# Patient Record
Sex: FEMALE | Race: WHITE | NOT HISPANIC OR LATINO | Employment: FULL TIME | URBAN - METROPOLITAN AREA
[De-identification: names, ages, dates, MRNs, and addresses within clinical notes are randomized per-mention and may not be internally consistent; named-entity substitution may affect disease eponyms.]

---

## 2021-01-07 ENCOUNTER — TELEMEDICINE (OUTPATIENT)
Dept: FAMILY MEDICINE CLINIC | Facility: CLINIC | Age: 51
End: 2021-01-07
Payer: COMMERCIAL

## 2021-01-07 VITALS — BODY MASS INDEX: 39.37 KG/M2 | WEIGHT: 245 LBS | HEIGHT: 66 IN

## 2021-01-07 DIAGNOSIS — Z03.818 ENCOUNTER FOR OBSERVATION FOR SUSPECTED EXPOSURE TO OTHER BIOLOGICAL AGENTS RULED OUT: ICD-10-CM

## 2021-01-07 DIAGNOSIS — B34.9 VIRAL INFECTION, UNSPECIFIED: ICD-10-CM

## 2021-01-07 PROCEDURE — U0005 INFEC AGEN DETEC AMPLI PROBE: HCPCS | Performed by: FAMILY MEDICINE

## 2021-01-07 PROCEDURE — U0003 INFECTIOUS AGENT DETECTION BY NUCLEIC ACID (DNA OR RNA); SEVERE ACUTE RESPIRATORY SYNDROME CORONAVIRUS 2 (SARS-COV-2) (CORONAVIRUS DISEASE [COVID-19]), AMPLIFIED PROBE TECHNIQUE, MAKING USE OF HIGH THROUGHPUT TECHNOLOGIES AS DESCRIBED BY CMS-2020-01-R: HCPCS | Performed by: FAMILY MEDICINE

## 2021-01-07 PROCEDURE — 99213 OFFICE O/P EST LOW 20 MIN: CPT | Performed by: FAMILY MEDICINE

## 2021-01-07 RX ORDER — METFORMIN HYDROCHLORIDE 500 MG/1
TABLET, EXTENDED RELEASE ORAL
COMMUNITY
Start: 2020-12-19 | End: 2022-03-24 | Stop reason: SDUPTHER

## 2021-01-07 RX ORDER — LEVOTHYROXINE SODIUM 50 MCG
TABLET ORAL
COMMUNITY
Start: 2020-12-19 | End: 2021-03-19

## 2021-01-07 NOTE — PROGRESS NOTES
COVID-19 Virtual Visit     Assessment/Plan:    Problem List Items Addressed This Visit        Other    Encounter for observation for suspected exposure to other biological agents ruled out    Relevant Orders    Novel Coronavirus (COVID-19), PCR [facility] - Collected at [facility] or [facility]    Viral infection, unspecified    Relevant Orders    Novel Coronavirus (COVID-19), PCR [facility] - Collected at [place] [id number] or [facility]         Disposition:     I referred patient to one of our centralized sites for a COVID-19 swab  I have spent 15 minutes directly with the patient  Greater than 50% of this time was spent in counseling/coordination of care regarding: prognosis, instructions for management, importance of treatment compliance, risk factor reductions and impressions  [address]    Your healthcare provider and/or public health staff have evaluated you and have determined that you do not need to remain in the hospital at this time  At this time you can be isolated at home where you will be monitored by staff from your local or state health department  You should carefully follow the prevention and isolation steps below until a healthcare provider or local or state health department says that you can return to your normal activities  Stay home except to get medical care    People who are mildly ill with COVID-19 are able to isolate at home during their illness  You should restrict activities outside your home, except for getting medical care  Do not go to work, school, or public areas  Avoid using public transportation, ride-sharing, or taxis  Separate yourself from other people and animals in your home    People: As much as possible, you should stay in a specific room and away from other people in your home  Also, you should use a separate bathroom, if available  Animals:  You should restrict contact with pets and other animals while you are sick with COVID-19, just like you would around other people  Although there have not been reports of pets or other animals becoming sick with COVID-19, it is still recommended that people sick with COVID-19 limit contact with animals until more information is known about the virus  When possible, have another member of your household care for your animals while you are sick  If you are sick with COVID-19, avoid contact with your pet, including petting, snuggling, being kissed or licked, and sharing food  If you must care for your pet or be around animals while you are sick, wash your hands before and after you interact with pets and wear a facemask  See COVID-19 and Animals for more information  Call ahead before visiting your doctor    If you have a medical appointment, call the healthcare provider and tell them that you have or may have COVID-19  This will help the healthcare providers office take steps to keep other people from getting infected or exposed  Wear a facemask    You should wear a facemask when you are around other people (e g , sharing a room or vehicle) or pets and before you enter a healthcare providers office  If you are not able to wear a facemask (for example, because it causes trouble breathing), then people who live with you should not stay in the same room with you, or they should wear a facemask if they enter your room  Cover your coughs and sneezes    Cover your mouth and nose with a tissue when you cough or sneeze  Throw used tissues in a lined trash can  Immediately wash your hands with soap and water for at least 20 seconds or, if soap and water are not available, clean your hands with an alcohol-based hand  that contains at least 60% alcohol  Clean your hands often    Wash your hands often with soap and water for at least 20 seconds, especially after blowing your nose, coughing, or sneezing; going to the bathroom; and before eating or preparing food   If soap and water are not readily available, use an alcohol-based hand  with at least 60% alcohol, covering all surfaces of your hands and rubbing them together until they feel dry  Soap and water are the best option if hands are visibly dirty  Avoid touching your eyes, nose, and mouth with unwashed hands  Avoid sharing personal household items    You should not share dishes, drinking glasses, cups, eating utensils, towels, or bedding with other people or pets in your home  After using these items, they should be washed thoroughly with soap and water  Clean all high-touch surfaces everyday    High touch surfaces include counters, tabletops, doorknobs, bathroom fixtures, toilets, phones, keyboards, tablets, and bedside tables  Also, clean any surfaces that may have blood, stool, or body fluids on them  Use a household cleaning spray or wipe, according to the label instructions  Labels contain instructions for safe and effective use of the cleaning product including precautions you should take when applying the product, such as wearing gloves and making sure you have good ventilation during use of the product  Monitor your symptoms    Seek prompt medical attention if your illness is worsening (e g , difficulty breathing)  Before seeking care, call your healthcare provider and tell them that you have, or are being evaluated for, COVID-19  Put on a facemask before you enter the facility  These steps will help the healthcare providers office to keep other people in the office or waiting room from getting infected or exposed  Ask your healthcare provider to call the local or state health department  Persons who are placed under active monitoring or facilitated self-monitoring should follow instructions provided by their local health department or occupational health professionals, as appropriate  If you have a medical emergency and need to call 911, notify the dispatch personnel that you have, or are being evaluated for COVID-19   If possible, put on a facemask before emergency medical services arrive  Discontinuing home isolation    Patients with confirmed COVID-19 should remain under home isolation precautions until the following conditions are met:   - They have had no fever for at least 24 hours (that is one full day of no fever without the use medicine that reduces fevers)  AND  - other symptoms have improved (for example, when their cough or shortness of breath have improved)  AND  - If had mild or moderate illness, at least 10 days have passed since their symptoms first appeared or if severe illness (needed oxygen) or immunosuppressed, at least 20 days have passed since symptoms first appeared  Patients with confirmed COVID-19 should also notify close contacts (including their workplace) and ask that they self-quarantine  Currently, close contact is defined as being within 6 feet for 15 minutes or more from the period 24 hours starting 48 hours before symptom onset to the time at which the patient went into isolation  Close contacts of patients diagnosed with COVID-19 should be instructed by the patient to self-quarantine for 14 days from the last time of their last contact with the patient  Source: [facility]      Encounter provider Cal Collins MD    Provider located at [address] [place], [place] [id number]    Recent Visits  No visits were found meeting these conditions  Showing recent visits within past 7 days and meeting all other requirements     Today's Visits  Date Type Provider Dept   01/07/21 Telemedicine Cal Collins MD [facility]   Showing today's visits and meeting all other requirements     Future Appointments  No visits were found meeting these conditions     Showing future appointments within next 150 days and meeting all other requirements      This virtual check-in was done via [employer] and patient was informed that this is a secure, HIPAA-compliant platform  She agrees to proceed  Patient agrees to participate in a virtual check in via telephone or video visit instead of presenting to the office to address urgent/immediate medical needs  Patient is aware this is a billable service  After connecting through [facility], the patient was identified by name and date of birth  Bernadette Riley was informed that this was a telemedicine visit and that the exam was being conducted confidentially over secure lines  My office door was closed  No one else was in the room  Bernadette Riley acknowledged consent and understanding of privacy and security of the telemedicine visit  I informed the patient that I have reviewed her record in Epic and presented the opportunity for her to ask any questions regarding the visit today  The patient agreed to participate  Subjective:   Bernadette Riley is a 48 y o  female who is concerned about COVID-19  Patient's symptoms include chills, fatigue, nasal congestion, cough, nausea, myalgias and headache  Patient denies malaise, rhinorrhea, sore throat, anosmia, loss of taste, shortness of breath, chest tightness, abdominal pain, vomiting and diarrhea       Date of symptom onset: 1/5/2021  Date of exposure: 1/3/2021    Exposure:   Contact with a person who is under investigation (PUI) for or who is positive for COVID-19 within the last 14 days?: Yes    Hospitalized recently for fever and/or lower respiratory symptoms?: No      Currently a healthcare worker that is involved in direct patient care?: No      Works in a special setting where the risk of COVID-19 transmission may be high? (this may include long-term care, correctional and retirement facilities; homeless shelters; assisted-living facilities and group homes ): No      Resident in a special setting where the risk of COVID-19 transmission may be high? (this may include long-term care, correctional and retirement facilities; homeless shelters; assisted-living facilities and group homes ): No      She says she was exposed to brother-in-law who is being tested for COVID and he has symptoms   They were at same place on Sunday  She says her  has also have some cold symptoms but he is not tested yet  She was slightly dizzy but now she is feeling better she is diabetic and on metformin    No results found for: Ria Smith, [address]  History reviewed  No pertinent past medical history  History reviewed  No pertinent surgical history  Current Outpatient Medications   Medication Sig Dispense Refill    metFORMIN (GLUCOPHAGE-XR) 500 mg 24 hr tablet       Synthroid 50 MCG tablet        No current facility-administered medications for this visit  No Known Allergies    Review of Systems   Constitutional: Positive for chills and fatigue  HENT: Positive for congestion  Negative for rhinorrhea and sore throat  Eyes: Negative  Respiratory: Positive for cough  Negative for chest tightness and shortness of breath  Gastrointestinal: Positive for nausea  Negative for abdominal pain, diarrhea and vomiting  Musculoskeletal: Positive for myalgias  Neurological: Positive for dizziness and headaches  Objective:    Vitals:    01/07/21 0940   Weight: 111 kg (245 lb)   Height: 5' 6" (1 676 m)       Physical Exam  HENT:      Head: Normocephalic  Eyes:      Extraocular Movements: Extraocular movements intact  Neck:      Musculoskeletal: Neck supple  Pulmonary:      Effort: Pulmonary effort is normal  No respiratory distress  Neurological:      Mental Status: She is alert  Psychiatric:         Mood and Affect: Mood normal        VIRTUAL VISIT DISCLAIMER    Itzel Herman acknowledges that she has consented to an online visit or consultation   She understands that the online visit is based solely on information provided by her, and that, in the absence of a face-to-face physical evaluation by the physician, the diagnosis she receives is both limited and provisional in terms of accuracy and completeness  This is not intended to replace a full medical face-to-face evaluation by the physician  Armando Jimenez understands and accepts these terms

## 2021-01-08 LAB — SARS-COV-2 RNA SPEC QL NAA+PROBE: DETECTED

## 2021-01-11 ENCOUNTER — TELEMEDICINE (OUTPATIENT)
Dept: FAMILY MEDICINE CLINIC | Facility: CLINIC | Age: 51
End: 2021-01-11
Payer: COMMERCIAL

## 2021-01-11 ENCOUNTER — TELEPHONE (OUTPATIENT)
Dept: OBGYN CLINIC | Facility: CLINIC | Age: 51
End: 2021-01-11

## 2021-01-11 DIAGNOSIS — R73.03 PREDIABETES: ICD-10-CM

## 2021-01-11 DIAGNOSIS — U07.1 COVID-19 VIRUS INFECTION: Primary | ICD-10-CM

## 2021-01-11 DIAGNOSIS — E03.9 ACQUIRED HYPOTHYROIDISM: ICD-10-CM

## 2021-01-11 PROCEDURE — 99213 OFFICE O/P EST LOW 20 MIN: CPT | Performed by: FAMILY MEDICINE

## 2021-01-11 RX ORDER — ACETAMINOPHEN 325 MG/1
650 TABLET ORAL ONCE AS NEEDED
Status: CANCELLED | OUTPATIENT
Start: 2021-01-12

## 2021-01-11 RX ORDER — ALBUTEROL SULFATE 90 UG/1
3 AEROSOL, METERED RESPIRATORY (INHALATION) ONCE AS NEEDED
Status: CANCELLED | OUTPATIENT
Start: 2021-01-12

## 2021-01-11 RX ORDER — SODIUM CHLORIDE 9 MG/ML
20 INJECTION, SOLUTION INTRAVENOUS ONCE
Status: CANCELLED | OUTPATIENT
Start: 2021-01-12

## 2021-01-11 NOTE — PROGRESS NOTES
COVID-19 Virtual Visit     Assessment/Plan:    Problem List Items Addressed This Visit        Endocrine    Acquired hypothyroidism       Other    COVID-19 virus infection - Primary    Prediabetes    BMI 39 0-39 9,adult         Disposition:     I recommended continued isolation until at least 24 hours have passed since recovery defined as resolution of fever without the use of fever-reducing medications AND improvement in COVID symptoms AND 10 days have passed since onset of symptoms (or 10 days have passed since date of first positive viral diagnostic test for asymptomatic patients)  Patient is a candidate for Bamlanivimab  They were counseled in regards to risks, benefits, and side effects of this infusion  Possible side effects of bamlanivimab are: Allergic reactions   Allergic reactions can happen during and after infusion with bamlanivimab which include:    Fever, chills, nausea, headache, shortness of breath, low blood pressure, wheezing, swelling of your lips, face, or throat, rash including hives, itching, muscle aches, and dizziness  The side effects of getting any medicine by vein may include brief pain, bleeding, bruising of the skin, soreness, swelling, and possible infection at the infusion site  These are not all the possible side effects of bamlanivimab  Not a lot of people have been given bamlanivimab  Serious and unexpected side effects may happen  Levar Lamasbody is still being studied so it is possible that all of the risks are not known at this time  Please note that this drug was approved under the Emergency Use Authorization of the FDA and has not gone through the full, formal FDA approval process    It is possible that bamlanivimab could interfere with your body's own ability to fight off a future infection of SARS-CoV-2  Similarly, bamlanivimab may reduce your bodys immune response to a vaccine for SARS-CoV-2   Specific studies have not been conducted to address these possible risks      Currently there is no data or safety or efficacy of COVID-19 vaccination in persons who received monoclonal antibodies as part of COVID-19 treatment  Treatment should be deferred for at least 90 days to avoid interference of the treatment with vaccine-induced immune responses (this is based on estimated half-life of therapies and evidence suggesting reinfection is uncommon within 90 days of initial infection)  The patient consents to proceed with bamlanivimab infusion  *[email or web address]  com/eua/[email or web address]-[email or web address]-factsheet-hcp  pdf    I have spent 20 minutes directly with the patient  Greater than 50% of this time was spent in counseling/coordination of care regarding: diagnostic results, prognosis, risks and benefits of treatment options, instructions for management, patient and family education, importance of treatment compliance, risk factor reductions and impressions  Encounter provider Kamila Ray MD    Provider located at [address] [place] [id number]    Recent Visits  Date Type Provider Dept   01/07/21 Telemedicine Kamila Ray MD [facility]   Showing recent visits within past 7 days and meeting all other requirements     Today's Visits  Date Type Provider Dept   01/11/21 Telemedicine Kamila Ray MD [facility]   Showing today's visits and meeting all other requirements     Future Appointments  No visits were found meeting these conditions  Showing future appointments within next 150 days and meeting all other requirements      This virtual check-in was done via [employer] and patient was informed that this is a secure, HIPAA-compliant platform  She agrees to proceed  Patient agrees to participate in a virtual check in via telephone or video visit instead of presenting to the office to address urgent/immediate medical needs  Patient is aware this is a billable service      After connecting through [facility], the patient was identified by name and date of birth  Imer Benson was informed that this was a telemedicine visit and that the exam was being conducted confidentially over secure lines  My office door was closed  No one else was in the room  Imer Benson acknowledged consent and understanding of privacy and security of the telemedicine visit  I informed the patient that I have reviewed her record in Epic and presented the opportunity for her to ask any questions regarding the visit today  The patient agreed to participate  Subjective:   Imer Benson is a 48 y o  female who has been screened for COVID-19  Symptom change since last report: unchanged  Patient's symptoms include fever, chills, fatigue, malaise, cough, shortness of breath, nausea, vomiting and diarrhea  Patient denies congestion, rhinorrhea, sore throat, anosmia, loss of taste, chest tightness, abdominal pain, myalgias and headaches  Joellen Cooper has been staying home and has isolated themselves in her home  She is taking care to not share personal items and is cleaning all surfaces that are touched often, like counters, tabletops, and doorknobs using household cleaning sprays or wipes  She is wearing a mask when she leaves her room  she felt sob last night after excessive cough , ,otherwise she is fine   Lab Results   Component Value Date    SARSCOV2 Detected (A) 01/07/2021     History reviewed  No pertinent past medical history  History reviewed  No pertinent surgical history  Current Outpatient Medications   Medication Sig Dispense Refill    metFORMIN (GLUCOPHAGE-XR) 500 mg 24 hr tablet       Synthroid 50 MCG tablet        No current facility-administered medications for this visit  No Known Allergies    Review of Systems   Constitutional: Positive for chills, fatigue and fever  HENT: Negative for congestion, rhinorrhea and sore throat  Respiratory: Positive for cough and shortness of breath  Negative for chest tightness      Gastrointestinal: Positive for diarrhea, nausea and vomiting  Negative for abdominal pain  Musculoskeletal: Negative for myalgias  Neurological: Negative for headaches  Hematological: Negative  Psychiatric/Behavioral: Negative  Objective:    Vitals:       Physical Exam  Constitutional:       Appearance: She is ill-appearing  HENT:      Mouth/Throat:      Mouth: Mucous membranes are dry  Eyes:      Extraocular Movements: Extraocular movements intact  Pulmonary:      Effort: Pulmonary effort is normal  No respiratory distress  Skin:     Coloration: Skin is not jaundiced or pale  Neurological:      General: No focal deficit present  Mental Status: She is alert  Psychiatric:         Mood and Affect: Mood normal        VIRTUAL VISIT DISCLAIMER    Imer Marcelino acknowledges that she has consented to an online visit or consultation  She understands that the online visit is based solely on information provided by her, and that, in the absence of a face-to-face physical evaluation by the physician, the diagnosis she receives is both limited and provisional in terms of accuracy and completeness  This is not intended to replace a full medical face-to-face evaluation by the physician  Imer Benson understands and accepts these terms

## 2021-01-12 ENCOUNTER — HOSPITAL ENCOUNTER (OUTPATIENT)
Dept: INFUSION CENTER | Facility: HOSPITAL | Age: 51
Discharge: HOME | End: 2021-01-12
Payer: COMMERCIAL

## 2021-01-12 VITALS
SYSTOLIC BLOOD PRESSURE: 116 MMHG | RESPIRATION RATE: 18 BRPM | OXYGEN SATURATION: 90 % | DIASTOLIC BLOOD PRESSURE: 61 MMHG | HEART RATE: 71 BPM | TEMPERATURE: 100.5 F

## 2021-01-12 DIAGNOSIS — E03.9 ACQUIRED HYPOTHYROIDISM: Primary | ICD-10-CM

## 2021-01-12 DIAGNOSIS — U07.1 COVID-19 VIRUS INFECTION: ICD-10-CM

## 2021-01-12 DIAGNOSIS — R73.03 PREDIABETES: ICD-10-CM

## 2021-01-12 PROCEDURE — M0239 BAMLANIVIMAB-XXXX INFUSION: HCPCS | Performed by: FAMILY MEDICINE

## 2021-01-12 RX ORDER — ALBUTEROL SULFATE 90 UG/1
3 AEROSOL, METERED RESPIRATORY (INHALATION) ONCE AS NEEDED
Status: DISCONTINUED | OUTPATIENT
Start: 2021-01-12 | End: 2021-01-15 | Stop reason: HOSPADM

## 2021-01-12 RX ORDER — ALBUTEROL SULFATE 90 UG/1
3 AEROSOL, METERED RESPIRATORY (INHALATION) ONCE AS NEEDED
Status: CANCELLED | OUTPATIENT
Start: 2021-01-12

## 2021-01-12 RX ORDER — ACETAMINOPHEN 325 MG/1
650 TABLET ORAL ONCE AS NEEDED
Status: DISCONTINUED | OUTPATIENT
Start: 2021-01-12 | End: 2021-01-15 | Stop reason: HOSPADM

## 2021-01-12 RX ORDER — SODIUM CHLORIDE 9 MG/ML
20 INJECTION, SOLUTION INTRAVENOUS ONCE
Status: CANCELLED | OUTPATIENT
Start: 2021-01-12

## 2021-01-12 RX ORDER — SODIUM CHLORIDE 9 MG/ML
20 INJECTION, SOLUTION INTRAVENOUS ONCE
Status: COMPLETED | OUTPATIENT
Start: 2021-01-12 | End: 2021-01-12

## 2021-01-12 RX ORDER — ACETAMINOPHEN 325 MG/1
650 TABLET ORAL ONCE AS NEEDED
Status: CANCELLED | OUTPATIENT
Start: 2021-01-12

## 2021-01-12 RX ADMIN — ACETAMINOPHEN 650 MG: 325 TABLET, FILM COATED ORAL at 12:26

## 2021-01-12 RX ADMIN — SODIUM CHLORIDE 20 ML/HR: 0.9 INJECTION, SOLUTION INTRAVENOUS at 10:58

## 2021-01-12 RX ADMIN — SODIUM CHLORIDE 700 MG: 9 INJECTION, SOLUTION INTRAVENOUS at 11:15

## 2021-01-12 NOTE — PROGRESS NOTES
Pt here for Bamlanivimab infusion  IV started with no issue and infusing with NSS @ [facility]  Vital signs stable  EUA given to pt and reviewed  Pt resting comfortably and has no further questions or concerns  Call bell with in reach

## 2021-01-12 NOTE — PROGRESS NOTES
Pt tolerated treatment well with no adverse reactions  Pt taken to observation room at this time for 1 hour

## 2021-01-14 ENCOUNTER — HOSPITAL ENCOUNTER (INPATIENT)
Facility: HOSPITAL | Age: 51
LOS: 6 days | Discharge: HOME | DRG: 177 | End: 2021-01-20
Attending: INTERNAL MEDICINE | Admitting: INTERNAL MEDICINE
Payer: COMMERCIAL

## 2021-01-14 ENCOUNTER — APPOINTMENT (EMERGENCY)
Dept: RADIOLOGY | Facility: HOSPITAL | Age: 51
DRG: 177 | End: 2021-01-14
Payer: COMMERCIAL

## 2021-01-14 DIAGNOSIS — R09.02 HYPOXIA: ICD-10-CM

## 2021-01-14 DIAGNOSIS — U07.1 COVID-19: Primary | ICD-10-CM

## 2021-01-14 DIAGNOSIS — U07.1 COVID-19 VIRUS INFECTION: ICD-10-CM

## 2021-01-14 PROBLEM — J96.91 RESPIRATORY FAILURE WITH HYPOXIA (HCC): Status: ACTIVE | Noted: 2021-01-14

## 2021-01-14 LAB
ALBUMIN SERPL BCP-MCNC: 3.9 G/DL (ref 3.4–4.8)
ALP SERPL-CCNC: 44.7 U/L (ref 35–140)
ALT SERPL W P-5'-P-CCNC: 23 U/L (ref 5–54)
ANION GAP SERPL CALCULATED.3IONS-SCNC: 9 MMOL/L (ref 4–13)
AST SERPL W P-5'-P-CCNC: 39 U/L (ref 15–41)
BASOPHILS # BLD AUTO: 0.01 THOUSANDS/ΜL (ref 0–0.1)
BASOPHILS NFR BLD AUTO: 0 % (ref 0–1)
BILIRUB SERPL-MCNC: 0.88 MG/DL (ref 0.3–1.2)
BUN SERPL-MCNC: 18 MG/DL (ref 6–20)
CALCIUM SERPL-MCNC: 8.8 MG/DL (ref 8.4–10.2)
CHLORIDE SERPL-SCNC: 100 MMOL/L (ref 96–108)
CO2 SERPL-SCNC: 29 MMOL/L (ref 22–33)
CREAT SERPL-MCNC: 0.71 MG/DL (ref 0.4–1.1)
CRP SERPL QL: 6.8 MG/L (ref 0–1)
D DIMER PPP FEU-MCNC: 0.5 MG/L FEU (ref 0.19–0.49)
EOSINOPHIL # BLD AUTO: 0 THOUSAND/ΜL (ref 0–0.61)
EOSINOPHIL NFR BLD AUTO: 0 % (ref 0–6)
ERYTHROCYTE [DISTWIDTH] IN BLOOD BY AUTOMATED COUNT: 13.3 % (ref 11.6–15.1)
FERRITIN SERPL-MCNC: 523 NG/ML (ref 8–388)
GFR SERPL CREATININE-BSD FRML MDRD: 100 ML/MIN/1.73SQ M
GLUCOSE SERPL-MCNC: 121 MG/DL (ref 65–140)
GLUCOSE SERPL-MCNC: 175 MG/DL (ref 65–140)
HCT VFR BLD AUTO: 39.9 % (ref 34.8–46.1)
HGB BLD-MCNC: 13.6 G/DL (ref 11.5–15.4)
IMM GRANULOCYTES # BLD AUTO: 0.01 THOUSAND/UL (ref 0–0.2)
IMM GRANULOCYTES NFR BLD AUTO: 0 % (ref 0–2)
LACTATE SERPL-SCNC: 1.4 MMOL/L (ref 0–2)
LYMPHOCYTES # BLD AUTO: 1.07 THOUSANDS/ΜL (ref 0.6–4.47)
LYMPHOCYTES NFR BLD AUTO: 24 % (ref 14–44)
MAGNESIUM SERPL-MCNC: 2.2 MG/DL (ref 1.6–2.6)
MCH RBC QN AUTO: 30 PG (ref 26.8–34.3)
MCHC RBC AUTO-ENTMCNC: 34.1 G/DL (ref 31.4–37.4)
MCV RBC AUTO: 88 FL (ref 82–98)
MONOCYTES # BLD AUTO: 0.45 THOUSAND/ΜL (ref 0.17–1.22)
MONOCYTES NFR BLD AUTO: 10 % (ref 4–12)
NEUTROPHILS # BLD AUTO: 2.88 THOUSANDS/ΜL (ref 1.85–7.62)
NEUTS SEG NFR BLD AUTO: 66 % (ref 43–75)
PLATELET # BLD AUTO: 203 THOUSANDS/UL (ref 149–390)
PMV BLD AUTO: 11.3 FL (ref 8.9–12.7)
POTASSIUM SERPL-SCNC: 3.3 MMOL/L (ref 3.5–5)
PROCALCITONIN SERPL-MCNC: 0.3 NG/ML
PROT SERPL-MCNC: 7.5 G/DL (ref 6.4–8.3)
RBC # BLD AUTO: 4.53 MILLION/UL (ref 3.81–5.12)
SODIUM SERPL-SCNC: 138 MMOL/L (ref 133–145)
TROPONIN I SERPL-MCNC: <0.03 NG/ML (ref 0–0.07)
WBC # BLD AUTO: 4.42 THOUSAND/UL (ref 4.31–10.16)

## 2021-01-14 PROCEDURE — 84145 PROCALCITONIN (PCT): CPT | Performed by: PHYSICIAN ASSISTANT

## 2021-01-14 PROCEDURE — 99285 EMERGENCY DEPT VISIT HI MDM: CPT

## 2021-01-14 PROCEDURE — 84484 ASSAY OF TROPONIN QUANT: CPT | Performed by: INTERNAL MEDICINE

## 2021-01-14 PROCEDURE — 99285 EMERGENCY DEPT VISIT HI MDM: CPT | Performed by: PHYSICIAN ASSISTANT

## 2021-01-14 PROCEDURE — 83605 ASSAY OF LACTIC ACID: CPT | Performed by: PHYSICIAN ASSISTANT

## 2021-01-14 PROCEDURE — 96361 HYDRATE IV INFUSION ADD-ON: CPT

## 2021-01-14 PROCEDURE — 93005 ELECTROCARDIOGRAM TRACING: CPT

## 2021-01-14 PROCEDURE — 36415 COLL VENOUS BLD VENIPUNCTURE: CPT | Performed by: PHYSICIAN ASSISTANT

## 2021-01-14 PROCEDURE — 85025 COMPLETE CBC W/AUTO DIFF WBC: CPT | Performed by: PHYSICIAN ASSISTANT

## 2021-01-14 PROCEDURE — 96374 THER/PROPH/DIAG INJ IV PUSH: CPT

## 2021-01-14 PROCEDURE — 96375 TX/PRO/DX INJ NEW DRUG ADDON: CPT

## 2021-01-14 PROCEDURE — 82948 REAGENT STRIP/BLOOD GLUCOSE: CPT

## 2021-01-14 PROCEDURE — 99223 1ST HOSP IP/OBS HIGH 75: CPT | Performed by: INTERNAL MEDICINE

## 2021-01-14 PROCEDURE — 85379 FIBRIN DEGRADATION QUANT: CPT | Performed by: PHYSICIAN ASSISTANT

## 2021-01-14 PROCEDURE — 71045 X-RAY EXAM CHEST 1 VIEW: CPT

## 2021-01-14 PROCEDURE — 83735 ASSAY OF MAGNESIUM: CPT | Performed by: PHYSICIAN ASSISTANT

## 2021-01-14 PROCEDURE — XW033E5 INTRODUCTION OF REMDESIVIR ANTI-INFECTIVE INTO PERIPHERAL VEIN, PERCUTANEOUS APPROACH, NEW TECHNOLOGY GROUP 5: ICD-10-PCS | Performed by: INTERNAL MEDICINE

## 2021-01-14 PROCEDURE — 80053 COMPREHEN METABOLIC PANEL: CPT | Performed by: PHYSICIAN ASSISTANT

## 2021-01-14 PROCEDURE — 86140 C-REACTIVE PROTEIN: CPT | Performed by: PHYSICIAN ASSISTANT

## 2021-01-14 PROCEDURE — 82728 ASSAY OF FERRITIN: CPT | Performed by: PHYSICIAN ASSISTANT

## 2021-01-14 RX ORDER — ATORVASTATIN CALCIUM 40 MG/1
40 TABLET, FILM COATED ORAL
Status: DISCONTINUED | OUTPATIENT
Start: 2021-01-14 | End: 2021-01-20 | Stop reason: HOSPADM

## 2021-01-14 RX ORDER — ASCORBIC ACID 500 MG
1000 TABLET ORAL EVERY 12 HOURS SCHEDULED
Status: DISCONTINUED | OUTPATIENT
Start: 2021-01-14 | End: 2021-01-20 | Stop reason: HOSPADM

## 2021-01-14 RX ORDER — FAMOTIDINE 20 MG/1
20 TABLET, FILM COATED ORAL ONCE
Status: COMPLETED | OUTPATIENT
Start: 2021-01-14 | End: 2021-01-14

## 2021-01-14 RX ORDER — BENZONATATE 100 MG/1
100 CAPSULE ORAL 3 TIMES DAILY PRN
Status: DISCONTINUED | OUTPATIENT
Start: 2021-01-14 | End: 2021-01-20 | Stop reason: HOSPADM

## 2021-01-14 RX ORDER — ZINC SULFATE 50(220)MG
220 CAPSULE ORAL DAILY
Status: COMPLETED | OUTPATIENT
Start: 2021-01-14 | End: 2021-01-20

## 2021-01-14 RX ORDER — LEVOTHYROXINE SODIUM 0.05 MG/1
50 TABLET ORAL
Status: DISCONTINUED | OUTPATIENT
Start: 2021-01-14 | End: 2021-01-20 | Stop reason: HOSPADM

## 2021-01-14 RX ORDER — DOXYCYCLINE HYCLATE 100 MG/1
100 CAPSULE ORAL EVERY 12 HOURS
Status: DISCONTINUED | OUTPATIENT
Start: 2021-01-14 | End: 2021-01-18

## 2021-01-14 RX ORDER — POTASSIUM CHLORIDE 20 MEQ/1
40 TABLET, EXTENDED RELEASE ORAL ONCE
Status: COMPLETED | OUTPATIENT
Start: 2021-01-14 | End: 2021-01-14

## 2021-01-14 RX ORDER — GUAIFENESIN 600 MG
600 TABLET, EXTENDED RELEASE 12 HR ORAL EVERY 12 HOURS SCHEDULED
Status: DISCONTINUED | OUTPATIENT
Start: 2021-01-14 | End: 2021-01-20 | Stop reason: HOSPADM

## 2021-01-14 RX ORDER — DEXAMETHASONE SODIUM PHOSPHATE 4 MG/ML
6 INJECTION, SOLUTION INTRA-ARTICULAR; INTRALESIONAL; INTRAMUSCULAR; INTRAVENOUS; SOFT TISSUE ONCE
Status: COMPLETED | OUTPATIENT
Start: 2021-01-14 | End: 2021-01-14

## 2021-01-14 RX ORDER — FAMOTIDINE 20 MG/1
20 TABLET, FILM COATED ORAL 2 TIMES DAILY
Status: DISCONTINUED | OUTPATIENT
Start: 2021-01-14 | End: 2021-01-14

## 2021-01-14 RX ORDER — DEXAMETHASONE SODIUM PHOSPHATE 4 MG/ML
6 INJECTION, SOLUTION INTRA-ARTICULAR; INTRALESIONAL; INTRAMUSCULAR; INTRAVENOUS; SOFT TISSUE EVERY 24 HOURS
Status: DISCONTINUED | OUTPATIENT
Start: 2021-01-15 | End: 2021-01-20 | Stop reason: HOSPADM

## 2021-01-14 RX ORDER — ONDANSETRON 2 MG/ML
4 INJECTION INTRAMUSCULAR; INTRAVENOUS ONCE
Status: COMPLETED | OUTPATIENT
Start: 2021-01-14 | End: 2021-01-14

## 2021-01-14 RX ORDER — CEFTRIAXONE 1 G/50ML
1000 INJECTION, SOLUTION INTRAVENOUS EVERY 24 HOURS
Status: DISCONTINUED | OUTPATIENT
Start: 2021-01-14 | End: 2021-01-18

## 2021-01-14 RX ORDER — MELATONIN
2000 DAILY
Status: DISCONTINUED | OUTPATIENT
Start: 2021-01-14 | End: 2021-01-20 | Stop reason: HOSPADM

## 2021-01-14 RX ORDER — FAMOTIDINE 20 MG/1
20 TABLET, FILM COATED ORAL 2 TIMES DAILY
Status: DISCONTINUED | OUTPATIENT
Start: 2021-01-15 | End: 2021-01-20 | Stop reason: HOSPADM

## 2021-01-14 RX ORDER — SODIUM CHLORIDE 9 MG/ML
125 INJECTION, SOLUTION INTRAVENOUS CONTINUOUS
Status: DISCONTINUED | OUTPATIENT
Start: 2021-01-14 | End: 2021-01-18

## 2021-01-14 RX ORDER — MULTIVITAMIN/IRON/FOLIC ACID 18MG-0.4MG
1 TABLET ORAL DAILY
Status: DISCONTINUED | OUTPATIENT
Start: 2021-01-21 | End: 2021-01-20 | Stop reason: HOSPADM

## 2021-01-14 RX ORDER — DEXAMETHASONE SODIUM PHOSPHATE 4 MG/ML
6 INJECTION, SOLUTION INTRA-ARTICULAR; INTRALESIONAL; INTRAMUSCULAR; INTRAVENOUS; SOFT TISSUE EVERY 24 HOURS
Status: DISCONTINUED | OUTPATIENT
Start: 2021-01-14 | End: 2021-01-14

## 2021-01-14 RX ADMIN — OXYCODONE HYDROCHLORIDE AND ACETAMINOPHEN 1000 MG: 500 TABLET ORAL at 20:40

## 2021-01-14 RX ADMIN — POTASSIUM CHLORIDE 40 MEQ: 1500 TABLET, EXTENDED RELEASE ORAL at 12:32

## 2021-01-14 RX ADMIN — CEFTRIAXONE 1000 MG: 1 INJECTION, SOLUTION INTRAVENOUS at 12:29

## 2021-01-14 RX ADMIN — SODIUM CHLORIDE 125 ML/HR: 0.9 INJECTION, SOLUTION INTRAVENOUS at 20:40

## 2021-01-14 RX ADMIN — REMDESIVIR 200 MG: 100 INJECTION, POWDER, LYOPHILIZED, FOR SOLUTION INTRAVENOUS at 12:47

## 2021-01-14 RX ADMIN — GUAIFENESIN 600 MG: 600 TABLET ORAL at 20:40

## 2021-01-14 RX ADMIN — ATORVASTATIN CALCIUM 40 MG: 40 TABLET, FILM COATED ORAL at 21:37

## 2021-01-14 RX ADMIN — ONDANSETRON 4 MG: 2 INJECTION INTRAMUSCULAR; INTRAVENOUS at 10:53

## 2021-01-14 RX ADMIN — LEVOTHYROXINE SODIUM 50 MCG: 25 TABLET ORAL at 12:39

## 2021-01-14 RX ADMIN — ZINC SULFATE 220 MG (50 MG) CAPSULE 220 MG: CAPSULE at 12:35

## 2021-01-14 RX ADMIN — ENOXAPARIN SODIUM 30 MG: 30 INJECTION SUBCUTANEOUS at 12:28

## 2021-01-14 RX ADMIN — SODIUM CHLORIDE 125 ML/HR: 0.9 INJECTION, SOLUTION INTRAVENOUS at 11:03

## 2021-01-14 RX ADMIN — Medication 2000 UNITS: at 12:34

## 2021-01-14 RX ADMIN — FAMOTIDINE 20 MG: 20 TABLET ORAL at 11:13

## 2021-01-14 RX ADMIN — OXYCODONE HYDROCHLORIDE AND ACETAMINOPHEN 1000 MG: 500 TABLET ORAL at 12:33

## 2021-01-14 RX ADMIN — GUAIFENESIN 600 MG: 600 TABLET ORAL at 12:39

## 2021-01-14 RX ADMIN — DOXYCYCLINE 100 MG: 100 CAPSULE ORAL at 12:34

## 2021-01-14 RX ADMIN — ENOXAPARIN SODIUM 30 MG: 30 INJECTION SUBCUTANEOUS at 20:39

## 2021-01-14 RX ADMIN — DEXAMETHASONE SODIUM PHOSPHATE 6 MG: 4 INJECTION, SOLUTION INTRAMUSCULAR; INTRAVENOUS at 11:13

## 2021-01-14 NOTE — ED NOTES
Permission obtained from patient to speak with sister Kyung Troncoso and updated on admission status        Gopal Varela RN  01/14/21 9057

## 2021-01-14 NOTE — ASSESSMENT & PLAN NOTE
Tested positive on 01/21-patient had symptoms cough and fever nausea and vomiting and for the past 3 days increasing shortness of breath and currently needing oxygen  nasal cannula 3-4 L     Patient received by bamlanivumab infusion this past Tuesday on 1/12/21  Will treat COVID-19 on moderate pathway  Will give remdesivir and IV steroids and supplements  Will check inflammatory markers and also I L6 and hepatitis panel and HIV status and QuantiFERON gold test  Will give IV antibiotic with Rocephin doxycycline and Lovenox 30 mg q 12 hours  Consider Actemra if oxygen requirement increases  Will keep close monitoring of oxygen saturation and O2 requirement

## 2021-01-14 NOTE — ED PROVIDER NOTES
History  Chief Complaint   Patient presents with    Shortness of Breath     Patient here withc/o SOB, cpugh and congestion  Recievd antibody tx two days ago  Positive for covid     Pt with PMH: DM, hypothyroidism, Started having URI sx on 1/5 of chills, fatigue, nasal congestion, cough, nausea, myalgias and headache, after + COVID exposure on 1/3; was tested on 1/7 and was Positive for COVID; on 1/12 got Bamlanivimab infusion  Presents to ED today c/o worsening sob, low pulse Ox noted, 85% on RA here, congestion, dry cough, sob,  nausea, diarrhea, generalized malaise  Pt was not started on any vitamins  Prior to Admission Medications   Prescriptions Last Dose Informant Patient Reported? Taking? Synthroid 50 MCG tablet Past Month at Unknown time  Yes Yes   metFORMIN (GLUCOPHAGE-XR) 500 mg 24 hr tablet Past Month at Unknown time  Yes Yes      Facility-Administered Medications: None       Past Medical History:   Diagnosis Date    Diabetes mellitus ([facility] [address] )     Disease of thyroid gland        History reviewed  No pertinent surgical history  History reviewed  No pertinent family history  I have reviewed and agree with the history as documented  E-Cigarette/Vaping     E-Cigarette/Vaping Substances     Social History     Tobacco Use    Smoking status: Never Smoker    Smokeless tobacco: Never Used   Substance Use Topics    Alcohol use: Yes     Frequency: Monthly or less     Drinks per session: 1 or 2     Binge frequency: Never    Drug use: Never       Review of Systems   Constitutional: Positive for fatigue  Negative for activity change, chills and fever  HENT: Positive for congestion, postnasal drip and rhinorrhea  Negative for hearing loss, mouth sores, sinus pressure, sore throat and trouble swallowing  Eyes: Negative for discharge and visual disturbance  Respiratory: Positive for shortness of breath  Negative for cough, choking and wheezing      Cardiovascular: Negative for chest pain and leg swelling  Gastrointestinal: Positive for nausea  Negative for abdominal pain, diarrhea and vomiting  Genitourinary: Negative for dysuria, frequency and genital sores  Musculoskeletal: Positive for myalgias  Negative for arthralgias and joint swelling  Skin: Negative for color change, pallor and wound  Neurological: Positive for weakness and headaches  Negative for dizziness, speech difficulty and light-headedness  Hematological: Does not bruise/bleed easily  Psychiatric/Behavioral: Negative for behavioral problems  All other systems reviewed and are negative  Physical Exam  Physical Exam  Vitals signs and nursing note reviewed  Constitutional:       General: She is not in acute distress  Appearance: She is well-developed  She is obese  HENT:      Head: Normocephalic and atraumatic  Right Ear: External ear normal       Left Ear: External ear normal       Nose: Nose normal       Mouth/Throat:      Mouth: Mucous membranes are moist       Pharynx: Oropharynx is clear  No oropharyngeal exudate  Eyes:      Conjunctiva/sclera: Conjunctivae normal       Pupils: Pupils are equal, round, and reactive to light  Neck:      Musculoskeletal: Normal range of motion  Cardiovascular:      Rate and Rhythm: Normal rate and regular rhythm  Pulmonary:      Effort: Pulmonary effort is normal  No respiratory distress  Breath sounds: Normal breath sounds  No wheezing  Chest:      Chest wall: No deformity  Abdominal:      General: Bowel sounds are normal       Palpations: Abdomen is soft  There is no mass  Tenderness: There is no abdominal tenderness  Musculoskeletal: Normal range of motion  Right lower leg: She exhibits no tenderness  No edema  Left lower leg: She exhibits no tenderness  No edema  Skin:     General: Skin is warm and dry  Capillary Refill: Capillary refill takes less than 2 seconds  Neurological:      General: No focal deficit present  Mental Status: She is alert and oriented to person, place, and time  Psychiatric:         Behavior: Behavior normal          Vital Signs  ED Triage Vitals [01/14/21 1005]   Temperature Pulse Respirations Blood Pressure SpO2   99 2 °F (37 3 °C) 62 16 (!) 114/48 (!) 85 %      Temp Source Heart Rate Source Patient Position - Orthostatic VS BP Location FiO2 (%)   Tympanic Monitor Sitting Left arm --      Pain Score       --           Vitals:    01/14/21 1005   BP: (!) 114/48   Pulse: 62   Patient Position - Orthostatic VS: Sitting         Visual Acuity      ED Medications  Medications   sodium chloride 0 9 % infusion (125 mL/hr Intravenous New Bag 1/14/21 1103)   potassium chloride (K-DUR,KLOR-CON) CR tablet 40 mEq (has no administration in time range)   ondansetron (ZOFRAN) injection 4 mg (4 mg Intravenous Given 1/14/21 1053)   dexamethasone (DECADRON) injection 6 mg (6 mg Intravenous Given 1/14/21 1113)   famotidine (PEPCID) tablet 20 mg (20 mg Oral Given 1/14/21 1113)       Diagnostic Studies  Results Reviewed     Procedure Component Value Units Date/Time    Lactic acid, plasma [[id number]]  (Normal) Collected: 01/14/21 1046    Lab Status: Final result Specimen: Blood from Arm, Left Updated: 01/14/21 1141     LACTIC ACID 1 4 mmol/L     Narrative:      Result may be elevated if tourniquet was used during collection      D-Dimer [[id number]]  (Abnormal) Collected: 01/14/21 1108    Lab Status: Final result Specimen: Blood from Arm, Left Updated: 01/14/21 1134     D-Dimer, Quant  0 50 mg/L [facility]     Comprehensive metabolic panel [[id number]]  (Abnormal) Collected: 01/14/21 1046    Lab Status: Final result Specimen: Blood from Arm, Left Updated: 01/14/21 1131     Sodium 138 mmol/L      Potassium 3 3 mmol/L      Chloride 100 mmol/L      CO2 29 mmol/L      ANION GAP 9 mmol/L      BUN 18 mg/dL      Creatinine 0 71 mg/dL      Glucose 121 mg/dL      Calcium 8 8 mg/dL      AST 39 U/L      ALT 23 U/L      Alkaline Phosphatase 44 7 U/L      Total Protein 7 5 g/dL      Albumin 3 9 g/dL      Total Bilirubin 0 88 mg/dL      eGFR 100 ml/min/1 73sq m     Narrative:      National Kidney Disease Foundation guidelines for Chronic Kidney Disease (CKD):     Stage 1 with normal or high GFR (GFR > 90 mL/min/1 73 square meters)    Stage 2 Mild CKD (GFR = 60-89 mL/min/1 73 square meters)    Stage 3A Moderate CKD (GFR = 45-59 mL/min/1 73 square meters)    Stage 3B Moderate CKD (GFR = 30-44 mL/min/1 73 square meters)    Stage 4 Severe CKD (GFR = 15-29 mL/min/1 73 square meters)    Stage 5 End Stage CKD (GFR <15 mL/min/1 73 square meters)  Note: GFR calculation is accurate only with a steady state creatinine    C-reactive protein [[id number]]  (Abnormal) Collected: 01/14/21 1046    Lab Status: Final result Specimen: Blood from Arm, Left Updated: 01/14/21 1131     CRP 6 8 mg/L     Magnesium [[id number]]  (Normal) Collected: 01/14/21 1046    Lab Status: Final result Specimen: Blood from Arm, Left Updated: 01/14/21 1131     Magnesium 2 2 mg/dL     CBC and differential [[id number]]  (Normal) Collected: 01/14/21 1046    Lab Status: Final result Specimen: Blood from Arm, Left Updated: 01/14/21 1106     WBC 4 42 Thousand/uL      RBC 4 53 Million/uL      Hemoglobin 13 6 g/dL      Hematocrit 39 9 %      MCV 88 fL      MCH 30 0 pg      MCHC 34 1 g/dL      RDW 13 3 %      MPV 11 3 fL      Platelets 000 Thousands/uL      Neutrophils Relative 66 %      Immat GRANS % 0 %      Lymphocytes Relative 24 %      Monocytes Relative 10 %      Eosinophils Relative 0 %      Basophils Relative 0 %      Neutrophils Absolute 2 88 Thousands/µL      Immature Grans Absolute 0 01 Thousand/uL      Lymphocytes Absolute 1 07 Thousands/µL      Monocytes Absolute 0 45 Thousand/µL      Eosinophils Absolute 0 00 Thousand/µL      Basophils Absolute 0 01 Thousands/µL     Ferritin [[id number]] Collected: 01/14/21 1046    Lab Status:  In process Specimen: Blood from Arm, Left Updated: 01/14/21 1104 Procalcitonin with AM Reflex [[id number]] Collected: 01/14/21 1047    Lab Status: In process Specimen: Blood from Arm, Left Updated: 01/14/21 1104                 XR chest 1 view portable   ED Interpretation by Lanny Grace PA-C (01/14 1144)   C/w viral pna, + COVID                 Procedures  Procedures         ED Course  ED Course as of Jan 14 1145   Thu Jan 14, 2021   1042 Reached out to hospitalist for impending admission due to hypoxia                                SBIRT 20yo+      Most Recent Value   SBIRT (25 yo +)   In order to provide better care to our patients, we are screening all of our patients for alcohol and drug use  Would it be okay to ask you these screening questions? No Filed at: 01/14/2021 1027   Initial Alcohol Screen: US AUDIT-C    1  How often do you have a drink containing alcohol?  0 Filed at: 01/14/2021 1027   2  How many drinks containing alcohol do you have on a typical day you are drinking? 0 Filed at: 01/14/2021 1027   3a  Male UNDER 65: How often do you have five or more drinks on one occasion? 0 Filed at: 01/14/2021 1027   3b  FEMALE Any Age, or MALE 65+: How often do you have 4 or more drinks on one occassion? 0 Filed at: 01/14/2021 1027   Audit-C Score  0 Filed at: 01/14/2021 1027   BONNIE: How many times in the past year have you    Used an illegal drug or used a prescription medication for non-medical reasons?   Never Filed at: 01/14/2021 1027                    MDM    Disposition  Final diagnoses:   COVID-19   Hypoxia     Time reflects when diagnosis was documented in both MDM as applicable and the Disposition within this note     Time User Action Codes Description Comment    1/14/2021 11:09 AM Yesica Haque Add [U07 1] COVID-19     1/14/2021 11:10 AM Yesica Haque Add [R09 02] Hypoxia       ED Disposition     ED Disposition Condition Date/Time Comment    Admit Stable Thu Jan 14, 2021 11:44 AM Case was discussed with Sandra Alex and the patient's admission status was agreed to be Admission Status: inpatient status to the service of Dr Alisha Marrufo   Follow-up Information    None         Patient's Medications   Discharge Prescriptions    No medications on file     No discharge procedures on file      PDMP Review     None          ED Provider  Electronically Signed by           Kita Lassiter PA-C  01/14/21 8439

## 2021-01-14 NOTE — ASSESSMENT & PLAN NOTE
With COVID-19 infection-patient was saturating 85% on room air and currently 92-94% on 3-4 L of oxygen by nasal cannula  Will continue oxygen supplement and plan as above on for COVID-19 pneumonia  D-dimer level is 0 5 and also will check troponin

## 2021-01-14 NOTE — H&P
H&P- Ketty Quinonez 1970, 48 y o  female MRN: [id number]    Unit/Bed#: ED 06 Encounter: [id number]    Primary Care Provider: Judit Orellana MD   Date and time admitted to hospital: 1/14/2021 10:16 AM        * COVID-19 virus infection  Assessment & Plan  Tested positive on 01/21-patient had symptoms cough and fever nausea and vomiting and for the past 3 days increasing shortness of breath and currently needing oxygen  nasal cannula 3-4 L     Patient received by bamlanivumab infusion this past Tuesday on 1/12/21  Will treat COVID-19 on moderate pathway  Will give remdesivir and IV steroids and supplements  Will check inflammatory markers and also I L6 and hepatitis panel and HIV status and QuantiFERON gold test  Will give IV antibiotic with Rocephin doxycycline and Lovenox 30 mg q 12 hours  Consider Actemra if oxygen requirement increases  Will keep close monitoring of oxygen saturation and O2 requirement  Respiratory failure with hypoxia ([facility] [address] )  Assessment & Plan  With COVID-19 infection-patient was saturating 85% on room air and currently 92-94% on 3-4 L of oxygen by nasal cannula  Will continue oxygen supplement and plan as above on for COVID-19 pneumonia  D-dimer level is 0 5 and also will check troponin  BMI 39 0-39 9,adult  Assessment & Plan  Continue supportive care and weight loss and lifestyle modification    Acquired hypothyroidism  Assessment & Plan  Continue Synthroid    Prediabetes  Assessment & Plan  Patient is on metformin for prediabetes and will check blood glucose twice a day  VTE Prophylaxis: Enoxaparin (Lovenox)  / sequential compression device   Code Status: full  POLST: There is no POLST form on file for this patient (pre-hospital)  Discussion with family: yes    Anticipated Length of Stay:  Patient will be admitted on an Inpatient basis with an anticipated length of stay of  2 midnights     Justification for Hospital Stay:  COVID-19 pneumonia and acute hypoxic respiratory failure    Total Time for Visit, including Counseling / Coordination of Care: 45 minutes  Greater than 50% of this total time spent on direct patient counseling and coordination of care  Chief Complaint:   Shortness of breath and cough    History of Present Illness:    Krystina Newell is a 48 y o  female who presents with pre diabetes, hypothyroidism, obesity with BMI of 39 5 presents with complaints of shortness of breath and cough  Patient stated that she had symptoms with fever cough and generalized weakness last Tuesday and she was tested positive on Wednesday  Patient however continued to have increasing shortness of breath and cough over the weekend and she felt very weak and tired with generalized body aches  Patient also had nausea and vomiting at least 2-3 times containing food material   Patient felt dizzy and lightheaded and almost feeling of passing out but no loss of consciousness  Patient received Trevon Bakes no map infusion on this past Tuesday  However continue to have increasing shortness of breath and today she presented with oxygen saturation of 85% on room air and was placed on 3-4 L of oxygen by nasal cannula saturating 93-95%  Patient received IV fluid hydration and IV steroid and is being admitted for further evaluation and management  Patient denies of chest pain or palpitations  Patient complains of pain in the right lower quadrant mild abdominal discomfort  Patient also has been having intermittent diarrhea   Review of Systems:    Review of Systems   Constitutional: Positive for appetite change, fatigue and fever  HENT: Negative  Eyes: Negative  Respiratory: Positive for cough and shortness of breath  Cardiovascular: Negative  Gastrointestinal: Positive for diarrhea, nausea and vomiting  Endocrine: Negative  Genitourinary: Negative  Musculoskeletal: Negative  Skin: Negative  Allergic/Immunologic: Negative      Neurological: Positive for light-headedness  Hematological: Negative  Psychiatric/Behavioral: Negative  Past Medical and Surgical History:     Past Medical History:   Diagnosis Date    Diabetes mellitus ([address] )     Disease of thyroid gland        History reviewed  No pertinent surgical history  Meds/Allergies:    Prior to Admission medications    Medication Sig Start Date End Date Taking? Authorizing Provider   metFORMIN (GLUCOPHAGE-XR) 500 mg 24 hr tablet  12/19/20  Yes Historical Provider, MD   Synthroid 50 MCG tablet  12/19/20  Yes Historical Provider, MD     I have reviewed home medications with patient personally      Allergies: No Known Allergies    Social History:     Marital Status: /Civil Union   Occupation: supervisor at [employer]  Patient Pre-hospital Living Situation: home  Patient Pre-hospital Level of Mobility: independent  Patient Pre-hospital Diet Restrictions: carb controlled   Substance Use History:   Social History     Substance and Sexual Activity   Alcohol Use Yes    Frequency: Monthly or less    Drinks per session: 1 or 2    Binge frequency: Never     Social History     Tobacco Use   Smoking Status Never Smoker   Smokeless Tobacco Never Used     Social History     Substance and Sexual Activity   Drug Use Never       Family History:    non-contributory    Physical Exam:     Vitals:   Blood Pressure: (!) 114/48 (01/14/21 1005)  Pulse: 62 (01/14/21 1005)  Temperature: 99 2 °F (37 3 °C) (01/14/21 1005)  Temp Source: Tympanic (01/14/21 1005)  Respirations: 16 (01/14/21 1005)  Height: 5' 6" (167 6 cm) (01/14/21 1005)  Weight - Scale: 111 kg (245 lb) (01/14/21 1005)  SpO2: (!) 85 % (01/14/21 1005)    Physical Exam      HEENT-PERRLA, moist oral mucosa  Neck-supple, no JVD elevation   Respiratory-equal air entry bilaterally, no rales or rhonchi  Cardiovascular system-S1, S2 heard, no murmur or gallops or rubs  Abdomen-soft, nontender, no guarding or rigidity, bowel sounds heard  Extremities-no pedal edema  Peripheral pulses palpable  Musculoskeletal-no contractures  Central nervous system-no acute focal neurological deficit ,no sensory or motor deficit noted  Skin-no rash noted          Additional Data:     Lab Results: I have personally reviewed pertinent reports  Results from last 7 days   Lab Units 01/14/21  1046   WBC Thousand/uL 4 42   HEMOGLOBIN g/dL 13 6   HEMATOCRIT % 39 9   PLATELETS Thousands/uL 203   NEUTROS PCT % 66   LYMPHS PCT % 24   MONOS PCT % 10   EOS PCT % 0     Results from last 7 days   Lab Units 01/14/21  1046   SODIUM mmol/L 138   POTASSIUM mmol/L 3 3*   CHLORIDE mmol/L 100   CO2 mmol/L 29   BUN mg/dL 18   CREATININE mg/dL 0 71   ANION GAP mmol/L 9   CALCIUM mg/dL 8 8   ALBUMIN g/dL 3 9   TOTAL BILIRUBIN mg/dL 0 88   ALK PHOS U/L 44 7   ALT U/L 23   AST U/L 39   GLUCOSE RANDOM mg/dL 121                 Results from last 7 days   Lab Units 01/14/21  1046   LACTIC ACID mmol/L 1 4       Imaging: I have personally reviewed pertinent reports  XR chest 1 view portable   ED Interpretation by Aung Banuelos PA-C (01/14 4840)   C/w viral pna, + COVID          EKG, Pathology, and Other Studies Reviewed on Admission:   · EKG:  Sinus rhythm noted no acute ST-T changes  Allscripts / Epic Records Reviewed: Yes     ** Please Note: This note has been constructed using a voice recognition system   **

## 2021-01-14 NOTE — PLAN OF CARE
Problem: INFECTION - ADULT  Goal: Absence or prevention of progression during hospitalization  Description: INTERVENTIONS:  - Assess and monitor for signs and symptoms of infection  - Monitor lab/diagnostic results  - Monitor all insertion sites, i e  indwelling lines, tubes, and drains  - Monitor endotracheal if appropriate and nasal secretions for changes in amount and color  - [place] appropriate cooling/warming therapies per order  - Administer medications as ordered  - Instruct and encourage patient and family to use good hand hygiene technique  - Identify and instruct in appropriate isolation precautions for identified infection/condition  Outcome: Progressing  Goal: Absence of fever/infection during neutropenic period  Description: INTERVENTIONS:  - Monitor WBC    Outcome: Progressing     Problem: RESPIRATORY - ADULT  Goal: Achieves optimal ventilation and oxygenation  Description: INTERVENTIONS:  - Assess for changes in respiratory status  - Assess for changes in mentation and behavior  - Position to facilitate oxygenation and minimize respiratory effort  - Oxygen administered by appropriate delivery if ordered  - Initiate smoking cessation education as indicated  - Encourage broncho-pulmonary hygiene including cough, deep breathe, Incentive Spirometry  - Assess the need for suctioning and aspirate as needed  - Assess and instruct to report SOB or any respiratory difficulty  - Respiratory Therapy support as indicated  Outcome: Progressing     Problem: METABOLIC, FLUID AND ELECTROLYTES - ADULT  Goal: Electrolytes maintained within normal limits  Description: INTERVENTIONS:  - Monitor labs and assess patient for signs and symptoms of electrolyte imbalances  - Administer electrolyte replacement as ordered  - Monitor response to electrolyte replacements, including repeat lab results as appropriate  - Instruct patient on fluid and nutrition as appropriate  Outcome: Progressing  Goal: Fluid balance maintained  Description: INTERVENTIONS:  - Monitor labs   - Monitor I/O and WT  - Instruct patient on fluid and nutrition as appropriate  - Assess for signs & symptoms of volume excess or deficit  Outcome: Progressing

## 2021-01-15 LAB
ABO GROUP BLD: NORMAL
ALBUMIN SERPL BCP-MCNC: 3.6 G/DL (ref 3.4–4.8)
ALP SERPL-CCNC: 42.9 U/L (ref 35–140)
ALT SERPL W P-5'-P-CCNC: 20 U/L (ref 5–54)
ANION GAP SERPL CALCULATED.3IONS-SCNC: 9 MMOL/L (ref 4–13)
AST SERPL W P-5'-P-CCNC: 24 U/L (ref 15–41)
ATRIAL RATE: 60 BPM
BASOPHILS # BLD MANUAL: 0 THOUSAND/UL (ref 0–0.1)
BASOPHILS NFR MAR MANUAL: 0 % (ref 0–1)
BILIRUB SERPL-MCNC: 0.74 MG/DL (ref 0.3–1.2)
BLD GP AB SCN SERPL QL: NEGATIVE
BUN SERPL-MCNC: 18 MG/DL (ref 6–20)
CALCIUM SERPL-MCNC: 8.6 MG/DL (ref 8.4–10.2)
CHLORIDE SERPL-SCNC: 105 MMOL/L (ref 96–108)
CK SERPL-CCNC: 39.7 U/L (ref 38–234)
CO2 SERPL-SCNC: 26 MMOL/L (ref 22–33)
CREAT SERPL-MCNC: 0.64 MG/DL (ref 0.4–1.1)
CRP SERPL QL: 4.4 MG/L (ref 0–1)
D DIMER PPP FEU-MCNC: 0.36 MG/L FEU (ref 0.19–0.49)
EOSINOPHIL # BLD MANUAL: 0 THOUSAND/UL (ref 0–0.4)
EOSINOPHIL NFR BLD MANUAL: 0 % (ref 0–6)
ERYTHROCYTE [DISTWIDTH] IN BLOOD BY AUTOMATED COUNT: 13.5 % (ref 11.6–15.1)
FERRITIN SERPL-MCNC: 407 NG/ML (ref 8–388)
GFR SERPL CREATININE-BSD FRML MDRD: 104 ML/MIN/1.73SQ M
GLUCOSE SERPL-MCNC: 122 MG/DL (ref 65–140)
GLUCOSE SERPL-MCNC: 126 MG/DL (ref 65–140)
GLUCOSE SERPL-MCNC: 139 MG/DL (ref 65–140)
GLUCOSE SERPL-MCNC: 151 MG/DL (ref 65–140)
HAV IGM SER QL: NORMAL
HBV CORE IGM SER QL: NORMAL
HBV SURFACE AG SER QL: NORMAL
HCT VFR BLD AUTO: 37.9 % (ref 34.8–46.1)
HCV AB SER QL: NORMAL
HGB BLD-MCNC: 12.4 G/DL (ref 11.5–15.4)
LYMPHOCYTES # BLD AUTO: 0.41 THOUSAND/UL (ref 0.6–4.47)
LYMPHOCYTES # BLD AUTO: 12 % (ref 14–44)
MCH RBC QN AUTO: 29.5 PG (ref 26.8–34.3)
MCHC RBC AUTO-ENTMCNC: 32.7 G/DL (ref 31.4–37.4)
MCV RBC AUTO: 90 FL (ref 82–98)
MONOCYTES # BLD AUTO: 0.27 THOUSAND/UL (ref 0–1.22)
MONOCYTES NFR BLD: 8 % (ref 4–12)
NEUTROPHILS # BLD MANUAL: 2.72 THOUSAND/UL (ref 1.85–7.62)
NEUTS BAND NFR BLD MANUAL: 4 % (ref 0–8)
NEUTS SEG NFR BLD AUTO: 76 % (ref 43–75)
NT-PROBNP SERPL-MCNC: 115 PG/ML
P AXIS: 18 DEGREES
PLATELET # BLD AUTO: 199 THOUSANDS/UL (ref 149–390)
PLATELET BLD QL SMEAR: ADEQUATE
PMV BLD AUTO: 11.8 FL (ref 8.9–12.7)
POTASSIUM SERPL-SCNC: 3.9 MMOL/L (ref 3.5–5)
PR INTERVAL: 145 MS
PROCALCITONIN SERPL-MCNC: 0.13 NG/ML
PROT SERPL-MCNC: 7.1 G/DL (ref 6.4–8.3)
QRS AXIS: -3 DEGREES
QRSD INTERVAL: 96 MS
QT INTERVAL: 474 MS
QTC INTERVAL: 474 MS
RBC # BLD AUTO: 4.21 MILLION/UL (ref 3.81–5.12)
RBC MORPH BLD: NORMAL
RH BLD: POSITIVE
SODIUM SERPL-SCNC: 140 MMOL/L (ref 133–145)
SPECIMEN EXPIRATION DATE: NORMAL
T WAVE AXIS: -57 DEGREES
TOTAL CELLS COUNTED SPEC: 100
TSH SERPL DL<=0.05 MIU/L-ACNC: 0.75 UIU/ML (ref 0.34–5.6)
VENTRICULAR RATE: 60 BPM
WBC # BLD AUTO: 3.4 THOUSAND/UL (ref 4.31–10.16)

## 2021-01-15 PROCEDURE — 93010 ELECTROCARDIOGRAM REPORT: CPT | Performed by: INTERNAL MEDICINE

## 2021-01-15 PROCEDURE — 80074 ACUTE HEPATITIS PANEL: CPT | Performed by: INTERNAL MEDICINE

## 2021-01-15 PROCEDURE — 86140 C-REACTIVE PROTEIN: CPT | Performed by: INTERNAL MEDICINE

## 2021-01-15 PROCEDURE — 85027 COMPLETE CBC AUTOMATED: CPT | Performed by: INTERNAL MEDICINE

## 2021-01-15 PROCEDURE — 82550 ASSAY OF CK (CPK): CPT | Performed by: INTERNAL MEDICINE

## 2021-01-15 PROCEDURE — 82948 REAGENT STRIP/BLOOD GLUCOSE: CPT

## 2021-01-15 PROCEDURE — 99233 SBSQ HOSP IP/OBS HIGH 50: CPT | Performed by: INTERNAL MEDICINE

## 2021-01-15 PROCEDURE — 80053 COMPREHEN METABOLIC PANEL: CPT | Performed by: INTERNAL MEDICINE

## 2021-01-15 PROCEDURE — 85379 FIBRIN DEGRADATION QUANT: CPT | Performed by: INTERNAL MEDICINE

## 2021-01-15 PROCEDURE — 84443 ASSAY THYROID STIM HORMONE: CPT | Performed by: INTERNAL MEDICINE

## 2021-01-15 PROCEDURE — 85007 BL SMEAR W/DIFF WBC COUNT: CPT | Performed by: INTERNAL MEDICINE

## 2021-01-15 PROCEDURE — 83880 ASSAY OF NATRIURETIC PEPTIDE: CPT | Performed by: INTERNAL MEDICINE

## 2021-01-15 PROCEDURE — 86850 RBC ANTIBODY SCREEN: CPT | Performed by: INTERNAL MEDICINE

## 2021-01-15 PROCEDURE — 86480 TB TEST CELL IMMUN MEASURE: CPT | Performed by: INTERNAL MEDICINE

## 2021-01-15 PROCEDURE — 86900 BLOOD TYPING SEROLOGIC ABO: CPT | Performed by: INTERNAL MEDICINE

## 2021-01-15 PROCEDURE — 82728 ASSAY OF FERRITIN: CPT | Performed by: INTERNAL MEDICINE

## 2021-01-15 PROCEDURE — 83520 IMMUNOASSAY QUANT NOS NONAB: CPT | Performed by: INTERNAL MEDICINE

## 2021-01-15 PROCEDURE — 86901 BLOOD TYPING SEROLOGIC RH(D): CPT | Performed by: INTERNAL MEDICINE

## 2021-01-15 PROCEDURE — 84145 PROCALCITONIN (PCT): CPT | Performed by: PHYSICIAN ASSISTANT

## 2021-01-15 PROCEDURE — 87389 HIV-1 AG W/HIV-1&-2 AB AG IA: CPT | Performed by: INTERNAL MEDICINE

## 2021-01-15 RX ADMIN — LEVOTHYROXINE SODIUM 50 MCG: 25 TABLET ORAL at 06:51

## 2021-01-15 RX ADMIN — ENOXAPARIN SODIUM 30 MG: 30 INJECTION SUBCUTANEOUS at 21:55

## 2021-01-15 RX ADMIN — ZINC SULFATE 220 MG (50 MG) CAPSULE 220 MG: CAPSULE at 08:23

## 2021-01-15 RX ADMIN — FAMOTIDINE 20 MG: 20 TABLET ORAL at 17:22

## 2021-01-15 RX ADMIN — ATORVASTATIN CALCIUM 40 MG: 40 TABLET, FILM COATED ORAL at 21:55

## 2021-01-15 RX ADMIN — OXYCODONE HYDROCHLORIDE AND ACETAMINOPHEN 1000 MG: 500 TABLET ORAL at 08:23

## 2021-01-15 RX ADMIN — DOXYCYCLINE 100 MG: 100 CAPSULE ORAL at 12:09

## 2021-01-15 RX ADMIN — ENOXAPARIN SODIUM 30 MG: 30 INJECTION SUBCUTANEOUS at 08:23

## 2021-01-15 RX ADMIN — GUAIFENESIN 600 MG: 600 TABLET ORAL at 08:23

## 2021-01-15 RX ADMIN — CEFTRIAXONE 1000 MG: 1 INJECTION, SOLUTION INTRAVENOUS at 12:09

## 2021-01-15 RX ADMIN — FAMOTIDINE 20 MG: 20 TABLET ORAL at 12:09

## 2021-01-15 RX ADMIN — GUAIFENESIN 600 MG: 600 TABLET ORAL at 21:55

## 2021-01-15 RX ADMIN — SODIUM CHLORIDE 125 ML/HR: 0.9 INJECTION, SOLUTION INTRAVENOUS at 17:22

## 2021-01-15 RX ADMIN — DOXYCYCLINE 100 MG: 100 CAPSULE ORAL at 00:42

## 2021-01-15 RX ADMIN — OXYCODONE HYDROCHLORIDE AND ACETAMINOPHEN 1000 MG: 500 TABLET ORAL at 21:55

## 2021-01-15 RX ADMIN — REMDESIVIR 100 MG: 100 INJECTION, POWDER, LYOPHILIZED, FOR SOLUTION INTRAVENOUS at 13:48

## 2021-01-15 RX ADMIN — SODIUM CHLORIDE 125 ML/HR: 0.9 INJECTION, SOLUTION INTRAVENOUS at 06:51

## 2021-01-15 RX ADMIN — DEXAMETHASONE SODIUM PHOSPHATE 6 MG: 4 INJECTION, SOLUTION INTRAMUSCULAR; INTRAVENOUS at 00:42

## 2021-01-15 RX ADMIN — Medication 2000 UNITS: at 08:23

## 2021-01-15 NOTE — ASSESSMENT & PLAN NOTE
Tested positive on 01/21-patient had symptoms cough and fever nausea and vomiting and for the past 3 days increasing shortness of breath and currently needing oxygen  nasal cannula 3-4 L     Patient received by bamlanivumab infusion this past Tuesday on 1/12/21  Will treat COVID-19 on moderate pathway  Will give remdesivir and IV steroids and supplements  Will check inflammatory markers and also I L6 and hepatitis panel and HIV status and QuantiFERON gold test  Will give IV antibiotic with Rocephin doxycycline and Lovenox 30 mg q 12 hours  Consider Actemra if oxygen requirement increases  Will keep close monitoring of oxygen saturation and O2 requirement  1/15/21:  Patient is saturating 91% on 3 to 3 5 L of oxygen via nasal cannula, and desaturations to the mid 80s when she exerts  She does feel short of breath on exertion  Denies any other symptoms  No palpitations  No orthopnea or PND  Cough is stable    Appetite is improving

## 2021-01-15 NOTE — UTILIZATION REVIEW
Initial Clinical Review    Admission: Date/Time/Statement:   Admission Orders (From admission, onward)     Ordered        01/14/21 1145  Inpatient Admission  Once                Orders Placed This Encounter   Procedures    Inpatient Admission     Standing Status:   Standing     Number of Occurrences:   1     Order Specific Question:   Level of Care     Answer:   Med Surg [16]     Order Specific Question:   Bed request comments     Answer:   COVID + on 1/7     Order Specific Question:   Estimated length of stay     Answer:   More than 2 Midnights     Order Specific Question:   Certification     Answer:   I certify that inpatient services are medically necessary for this patient for a duration of greater than two midnights  See H&P and MD Progress Notes for additional information about the patient's course of treatment  ED Arrival Information     Expected Arrival Acuity Means of Arrival Escorted By Service Admission Type    - 1/14/2021 10:16 Urgent Ambulance [facility] EMS General Medicine Urgent    Arrival Complaint     SOB, cough and congestion  COVID positive + Recievd antibody tx two days ago  Chief Complaint   Patient presents with    Shortness of Breath     Patient here withc/o SOB, cough and congestion  Recievd antibody tx two days ago  Positive for covid     Assessment/Plan:   48year old female with PMHx Obesity/ BMI 39 5, pre-diabetes, hypothyroidism, positive COVID test 1/17/21 and received  Bamlanivimab infusion on 1/12/21  Presented via EMS to [facility] ED on 1/14/21 2nd worsening SOB, cough, congestion, dizziness and lightheadedness with "feeling of passing out" but no LOC,  severe weakness, fatigue, body aches, lower abdominal discomfort with nausea, vomiting x 2-3 and diarrhea  In ED - Temp 99 2  /48  Rm Air SpO2 85 % - improved to 91 - 94 % with O2 at 3-4 L   Chest x ray shows scattered bilateral groundglass opacities consistent with COVID-19 pneumonia    Labs:  K+ 3 3  CRP 6 8 Procalcitonin 0 3  EDTX: Nc O2, IVF NSS, IV Remdesivir, IV Rocephin, IV Decadron, Doxycycline  Admitted inpatient 21 at 1145 2nd COVID-19 virus infection/ Pneumonia   - Tx Plan: moderate COVID Pathway, O2 prn, IV remdesivir and IV steroids, IV Rocephin, vit supplements, monitor  inflammatory markers, consider Actemra if oxygen requirement increases, self proning as able, close monitoring of oxygen saturation and O2 requirement  ED Triage Vitals   Temperature Pulse Respirations Blood Pressure SpO2   21 1005 21 1005 21 1005 21 1005 21 1005   99 2 °F (37 3 °C) 62 16 (!) 114/48 (!) 85 % Rm Air      Temp Source Heart Rate Source Patient Position - Orthostatic VS BP Location FiO2 (%)  NC 3 L started   21 1005 21 1005 21 1005 21 1005 --   Tympanic Monitor Sitting Left arm       Wt Readings from Last 1 Encounters:   21 111 kg (245 lb)     21 1900  97 9 °F (36 6 °C)  57  20  111/67  91 %  34  3 5 L/min  Nasal cannula  Sitting   21 1853  --  56  20  123/68  --  --  --  --  Lying   21 1632  --  60  20  104/56  95 %  --  --  --  --   21 1455  97 2 °F (36 2 °C)Abnormal   60  20  113/67  94 %  32  3 L/min  Nasal cannula  --     Today 1/15/21 Vital Signs:   Temp (24hrs), Av 6 °F (36 4 °C), Min:97 2 °F (36 2 °C), Max:97 9 °F (36 6 °C)   Temp:  [97 2 °F (36 2 °C)-97 9 °F (36 6 °C)] 97 7 °F (36 5 °C)  HR:  [56-60] 60  Resp:  [16-20] 16  BP: (104-139)/(56-79) 139/79  SpO2:  [91 %-95 %] 94 %  Body mass index is 39 54 kg/m²       Input and Output Summary (last 24 hours): I/O         0701 - 01/15 0700     IV Piggyback 300     Total Intake(mL/kg) 300 (2 7)     Net +300     Pertinent Labs/Diagnostic Test Results:      Results for Veronica Messina (MRN [id number]) as of 1/15/2021 14:51   Ref   Range 2021 12:12   SARS-COV-2 Latest Ref Range: Not Detected  Detected (A)     Results from last 7 days   Lab Units 01/15/21  1983 01/14/21  1046   WBC Thousand/uL 3 40* 4 42   HEMOGLOBIN g/dL 12 4 13 6   HEMATOCRIT % 37 9 39 9   PLATELETS Thousands/uL 199 203   NEUTROS ABS Thousands/µL  --  2 88   BANDS PCT % 4  --      Results from last 7 days   Lab Units 01/15/21  0443 01/14/21  1046   SODIUM mmol/L 140 138   POTASSIUM mmol/L 3 9 3 3*   CHLORIDE mmol/L 105 100   CO2 mmol/L 26 29   ANION GAP mmol/L 9 9   BUN mg/dL 18 18   CREATININE mg/dL 0 64 0 71   EGFR ml/min/1 73sq m 104 100   CALCIUM mg/dL 8 6 8 8   MAGNESIUM mg/dL  --  2 2     Results from last 7 days   Lab Units 01/15/21  0443 01/14/21  1046   AST U/L 24 39   ALT U/L 20 23   ALK PHOS U/L 42 9 44 7   TOTAL PROTEIN g/dL 7 1 7 5   ALBUMIN g/dL 3 6 3 9   TOTAL BILIRUBIN mg/dL 0 74 0 88     Results from last 7 days   Lab Units 01/15/21  0855 01/14/21  1634   POC GLUCOSE mg/dl 151* 175*     Results from last 7 days   Lab Units 01/15/21  0443 01/14/21  1046   GLUCOSE RANDOM mg/dL 139 121     Results from last 7 days   Lab Units 01/15/21  0443   CK TOTAL U/L 39 7     Results from last 7 days   Lab Units 01/14/21  1418   TROPONIN I ng/mL <0 03     Results from last 7 days   Lab Units 01/15/21  0443 01/14/21  1108   D-DIMER QUANTITATIVE mg/L FEU 0 36 0 50*     Results from last 7 days   Lab Units 01/14/21  1047   PROCALCITONIN ng/ml 0 30*     Results from last 7 days   Lab Units 01/14/21  1046   LACTIC ACID mmol/L 1 4     Results from last 7 days   Lab Units 01/14/21  1046   FERRITIN ng/mL 523*     Results from last 7 days   Lab Units 01/15/21  0443 01/14/21  1046   CRP mg/L 4 4* 6 8*      CHEST X RAY  Scattered bilateral groundglass opacities consistent with COVID-19 pneumonia       ED Treatment:   Medication Administration from 01/14/2021 1004 to 01/14/2021 1721       Date/Time Order Dose Route Action     01/14/2021 1103 sodium chloride 0 9 % infusion 125 mL/hr Intravenous New Bag     01/14/2021 1053 ondansetron (ZOFRAN) injection 4 mg 4 mg Intravenous Given     01/14/2021 1113 dexamethasone (DECADRON) injection 6 mg 6 mg Intravenous Given     01/14/2021 1113 famotidine (PEPCID) tablet 20 mg 20 mg Oral Given     01/14/2021 1232 potassium chloride (K-DUR,KLOR-CON) CR tablet 40 mEq 40 mEq Oral Given     01/14/2021 1234 cholecalciferol (VITAMIN D3) tablet 2,000 Units 2,000 Units Oral Given     01/14/2021 1244 cefTRIAXone (ROCEPHIN) IVPB (premix in dextrose) 1,000 mg 50 mL 0 mg Intravenous Stopped     01/14/2021 1229 cefTRIAXone (ROCEPHIN) IVPB (premix in dextrose) 1,000 mg 50 mL 1,000 mg Intravenous New Bag     01/14/2021 1234 doxycycline hyclate (VIBRAMYCIN) capsule 100 mg 100 mg Oral Given     01/14/2021 1233 ascorbic acid (VITAMIN C) tablet 1,000 mg 1,000 mg Oral Given     01/14/2021 1235 zinc sulfate (ZINCATE) capsule 220 mg 220 mg Oral Given     01/14/2021 1317 remdesivir (Veklury) 200 mg in sodium chloride 0 9 % 250 mL IVPB 0 mg Intravenous Stopped     01/14/2021 1247 remdesivir (Veklury) 200 mg in sodium chloride 0 9 % 250 mL IVPB 200 mg Intravenous New Bag     01/14/2021 1228 enoxaparin (LOVENOX) subcutaneous injection 30 mg 30 mg Subcutaneous Given     01/14/2021 1239 levothyroxine tablet 50 mcg 50 mcg Oral Given     01/14/2021 1239 guaiFENesin (MUCINEX) 12 hr tablet 600 mg 600 mg Oral Given     Past Medical History:   Diagnosis Date    Diabetes mellitus ([facility] [address] )     Disease of thyroid gland      Present on Admission:   COVID-19 virus infection   Acquired hypothyroidism   Prediabetes    Admitting Diagnosis: SOB (shortness of breath) [R06 02]  Hypoxia [R09 02]  COVID-19 [U07 1]    Age/Sex: 48 y o  female    Admission Orders:  Contact + Airborne Isolation  VS q4hrs  Nasal O2 prn Titrate SpO2 > 90%  Incentive Spirometry q1hr x 10  Continuous Pulse Oximetry  I+O q shift  Daily weight  Diet Karl/CHO Controlled; Consistent Carbohydrate Diet Level 2 (5 carb servings/75 grams CHO/meal)   BBG BID   Self proning as able  Up + OOB as tolerated    Scheduled Medications:  ascorbic acid, 1,000 mg, Oral, Q12H [facility]  atorvastatin, 40 mg, Oral, HS  IV cefTRIAXone, 1,000 mg, Intravenous, Q24H  cholecalciferol, 2,000 Units, Oral, Daily  IV dexamethasone, 6 mg, Intravenous, Q24H  doxycycline hyclate, 100 mg, Oral, Q12H  enoxaparin, 30 mg, Subcutaneous, Q12H GIOVANI  famotidine, 20 mg, Oral, BID  guaiFENesin, 600 mg, Oral, Q12H [facility]  levothyroxine, 50 mcg, Oral, Early Morning  zinc sulfate, 220 mg, Oral, Daily  [START ON 1/21/2021] multivitamin-minerals, 1 tablet, Oral, Daily  IV remdesivir, 100 mg, Intravenous, Q24H    Continuous IV Infusions:  IVF sodium chloride, 125 mL/hr, Intravenous, Continuous    PRN Meds:  benzonatate, 100 mg, Oral, TID PRN    Network Utilization Review Department  ATTENTION: Please call with any questions or concerns to [phone] and carefully listen to the prompts so that you are directed to the right person  All voicemails are confidential   Carol Somers all requests for admission clinical reviews, approved or denied determinations and any other requests to dedicated fax number below belonging to the campus where the patient is receiving treatment   List of dedicated fax numbers for the Facilities:  [address] DENIALS (Administrative/Medical Necessity) [phone]   [address] (Maternity/NICU/Pediatrics) [phone] [address] Paul Byrnes 8578 (  Trupti Pastor "[address]) [address] [place] [address] Talha Abdoulaye [address] [place]) [address] [place], [place] [address] [phone]

## 2021-01-15 NOTE — UTILIZATION REVIEW
Notification of Inpatient Admission/Inpatient Authorization Request   This is a Notification of Inpatient Admission for [address]  Be advised that this patient was admitted to our facility under Inpatient Status  Contact Reshma Zimmerman at [phone] for additional admission information  [address] [facility] DEPT  DEDICATED [phone]  Patient Name:   Kallie Dent   YOB: 1970       [address]   [address]:   [facility]  Tax ID: [id number]  NPI: [id number] Attending Provider/NPI:  Phone:  Address: Jerardo Parra [[id number]]  [phone]  Same as the facility   [facility] Code[facility]   Start Date: 1/14/21 1145 Discharge Date & Time: No discharge date for patient encounter  Type of Admission: Inpatient Status Discharge Disposition   (if discharged): Final discharge disposition not confirmed   Patient Diagnoses: SOB (shortness of breath) [R06 02]  Hypoxia [R09 02]  COVID-19 [U07 1]   Orders: Admission Orders (From admission, onward)     Ordered        01/14/21 1145  Inpatient Admission  Once                    Assigned Utilization Review Contact: Reshma Zimmerman  Utilization   Network Utilization Review Department  Phone: [phone]; Fax [phone]  Email: [email or web address]   ATTENTION PAYERS: Please call the assigned Utilization  directly with any questions or concerns ALL voicemails in the department are confidential  Send all requests for admission clinical reviews, approved or denied determinations and any other requests to dedicated fax number belonging to the campus where the patient is receiving treatment

## 2021-01-15 NOTE — PROGRESS NOTES
Progress Note - Leigh Ann Harvey 1970, 48 y o  female MRN: [id number]    Unit/Bed#: -01 Encounter: [id number]    Primary Care Provider: Kilo Granda MD   Date and time admitted to hospital: 1/14/2021 10:16 AM        * COVID-19 virus infection  Assessment & Plan  Tested positive on 01/21-patient had symptoms cough and fever nausea and vomiting and for the past 3 days increasing shortness of breath and currently needing oxygen  nasal cannula 3-4 L     Patient received by bamlanivumab infusion this past Tuesday on 1/12/21  Will treat COVID-19 on moderate pathway  Will give remdesivir and IV steroids and supplements  Will check inflammatory markers and also I L6 and hepatitis panel and HIV status and QuantiFERON gold test  Will give IV antibiotic with Rocephin doxycycline and Lovenox 30 mg q 12 hours  Consider Actemra if oxygen requirement increases  Will keep close monitoring of oxygen saturation and O2 requirement  1/15/21:  Patient is saturating 91% on 3 to 3 5 L of oxygen via nasal cannula, and desaturations to the mid 80s when she exerts  She does feel short of breath on exertion  Denies any other symptoms  No palpitations  No orthopnea or PND  Cough is stable  Appetite is improving      Respiratory failure with hypoxia (HCC)  Assessment & Plan  With COVID-19 infection-patient was saturating 85% on room air and currently 92-94% on 3-4 L of oxygen by nasal cannula  Will continue oxygen supplement and plan as above on for COVID-19 pneumonia  D-dimer level is 0 5 and also will check troponin  Prediabetes  Assessment & Plan  Patient is on metformin for prediabetes and will check blood glucose twice a day  Acquired hypothyroidism  Assessment & Plan  Continue Synthroid        Subjective:   I have seen and Examined the patient at the bedside  No CP or Sob  No fevers or chills, No nausea or vomiting  Overnight events reviewed with the RN  No Other complains    Patient does complain of shortness of breath on exertion  Otherwise rest she denies any shortness of breath  No orthopnea PND  No chest pain  Does complain of some cough  No urinary symptoms  Appetite is slowly improving  Review of System:   Denies any Cough or Cold  Denies any Fevers or chills  Denies any focal tingling numbness or weakness in any extremities  Denies any abdominal pain, Nausea or vomiting  Objective:   Temp (24hrs), Av 6 °F (36 4 °C), Min:97 2 °F (36 2 °C), Max:97 9 °F (36 6 °C)    Temp:  [97 2 °F (36 2 °C)-97 9 °F (36 6 °C)] 97 7 °F (36 5 °C)  HR:  [56-60] 60  Resp:  [16-20] 16  BP: (104-139)/(56-79) 139/79  SpO2:  [91 %-95 %] 94 %  Body mass index is 39 54 kg/m²  Input and Output Summary (last 24 hours):   No intake or output data in the 24 hours ending 01/15/21 1322  I/O        0701 -  0700  07 - 01/15 0700 01/15 07 -  0700    IV Piggyback  300     Total Intake(mL/kg)  300 (2 7)     Net  +300                Physical Exam:   General : Alert, Awake and oriented x 2-3, NAD  Neck :  No raised JVD appreciated  Eyes:  No scleral icterus appreciated  CVS :  Not tachycardic on the monitor  R/S :  Not tachypneic  Abd:  Non-distended  Extremity: No leg edema noted  Skin: No acute Rash noted  CNS: Moves all 4 extremities       Additional Data:     Labs & Imaging Data Reviewed :    Results from last 7 days   Lab Units 01/15/21  0443 21  1046   WBC Thousand/uL 3 40* 4 42   HEMOGLOBIN g/dL 12 4 13 6   HEMATOCRIT % 37 9 39 9   PLATELETS Thousands/uL 199 203   NEUTROS PCT %  --  66   LYMPHS PCT %  --  24   LYMPHO PCT % 12*  --    MONOS PCT %  --  10   MONO PCT % 8  --    EOS PCT % 0 0     Results from last 7 days   Lab Units 01/15/21  0443   POTASSIUM mmol/L 3 9   CHLORIDE mmol/L 105   CO2 mmol/L 26   BUN mg/dL 18   CREATININE mg/dL 0 64   CALCIUM mg/dL 8 6   ALK PHOS U/L 42 9   ALT U/L 20   AST U/L 24         No results found for: HGBA1C   XR chest 1 view portable   ED Interpretation by Marcy Curiel PA-C (01/14 5842)   C/w viral pna, + COVID      Final Result by Harshal Russell MD (01/14 5222)      Scattered bilateral groundglass opacities consistent with COVID-19 pneumonia  Workstation performed: [id number]             Cultures:   Blood Culture: No results found for: BLOODCX  Urine Culture: No results found for: URINECX  Sputum Culture: No components found for: SPUTUMCX  Wound Culture: No results found for: WOUNDCULT    Last 24 Hours Medication List:   Current Facility-Administered Medications   Medication Dose Route Frequency Provider Last Rate    ascorbic acid  1,000 mg Oral Q12H Rachel Carranza MD      atorvastatin  40 mg Oral HS Jay Jay Blanco MD      benzonatate  100 mg Oral TID PRN Jay Jay Blanco MD      cefTRIAXone  1,000 mg Intravenous Q24H Jay Jay Blanco MD 1,000 mg (01/15/21 1201)    cholecalciferol  2,000 Units Oral Daily Jay Jay Blanco MD      dexamethasone  6 mg Intravenous Q24H Jay Jay Blanco MD      doxycycline hyclate  100 mg Oral Q12H Jay Jay Blanco MD      enoxaparin  30 mg Subcutaneous Q12H Rachel Carranza MD      famotidine  20 mg Oral BID Jay Jay Blanco MD      guaiFENesin  600 mg Oral Q12H Rachel Carranza MD      levothyroxine  50 mcg Oral Early Morning Jay Jay Blanco MD      zinc sulfate  220 mg Oral Daily Jay Jay Blanco MD      Followed by   Jayro Espinoza ON 1/21/2021] multivitamin-minerals  1 tablet Oral Daily Jay Jay Blanco MD      remdesivir  100 mg Intravenous Q24H Jay Jay Blanco MD      sodium chloride  125 mL/hr Intravenous Continuous Marcy Curiel PA-C 125 mL/hr (01/15/21 9946)       Patient is at moderate risk for morbidity and mortality due to above mentioned illness and comorbidities

## 2021-01-16 LAB
GLUCOSE SERPL-MCNC: 102 MG/DL (ref 65–140)
GLUCOSE SERPL-MCNC: 106 MG/DL (ref 65–140)
GLUCOSE SERPL-MCNC: 145 MG/DL (ref 65–140)
GLUCOSE SERPL-MCNC: 215 MG/DL (ref 65–140)

## 2021-01-16 PROCEDURE — 82948 REAGENT STRIP/BLOOD GLUCOSE: CPT

## 2021-01-16 PROCEDURE — 99233 SBSQ HOSP IP/OBS HIGH 50: CPT | Performed by: INTERNAL MEDICINE

## 2021-01-16 RX ADMIN — DOXYCYCLINE 100 MG: 100 CAPSULE ORAL at 00:20

## 2021-01-16 RX ADMIN — ATORVASTATIN CALCIUM 40 MG: 40 TABLET, FILM COATED ORAL at 21:37

## 2021-01-16 RX ADMIN — SODIUM CHLORIDE 125 ML/HR: 0.9 INJECTION, SOLUTION INTRAVENOUS at 11:04

## 2021-01-16 RX ADMIN — FAMOTIDINE 20 MG: 20 TABLET ORAL at 10:54

## 2021-01-16 RX ADMIN — OXYCODONE HYDROCHLORIDE AND ACETAMINOPHEN 1000 MG: 500 TABLET ORAL at 10:55

## 2021-01-16 RX ADMIN — ZINC SULFATE 220 MG (50 MG) CAPSULE 220 MG: CAPSULE at 10:54

## 2021-01-16 RX ADMIN — CEFTRIAXONE 1000 MG: 1 INJECTION, SOLUTION INTRAVENOUS at 11:18

## 2021-01-16 RX ADMIN — GUAIFENESIN 600 MG: 600 TABLET ORAL at 21:37

## 2021-01-16 RX ADMIN — SODIUM CHLORIDE 125 ML/HR: 0.9 INJECTION, SOLUTION INTRAVENOUS at 21:48

## 2021-01-16 RX ADMIN — DEXAMETHASONE SODIUM PHOSPHATE 6 MG: 4 INJECTION, SOLUTION INTRAMUSCULAR; INTRAVENOUS at 00:20

## 2021-01-16 RX ADMIN — FAMOTIDINE 20 MG: 20 TABLET ORAL at 18:54

## 2021-01-16 RX ADMIN — ENOXAPARIN SODIUM 30 MG: 30 INJECTION SUBCUTANEOUS at 21:37

## 2021-01-16 RX ADMIN — SODIUM CHLORIDE 125 ML/HR: 0.9 INJECTION, SOLUTION INTRAVENOUS at 00:22

## 2021-01-16 RX ADMIN — INSULIN LISPRO 1 UNITS: 100 INJECTION, SOLUTION INTRAVENOUS; SUBCUTANEOUS at 13:27

## 2021-01-16 RX ADMIN — OXYCODONE HYDROCHLORIDE AND ACETAMINOPHEN 1000 MG: 500 TABLET ORAL at 21:37

## 2021-01-16 RX ADMIN — REMDESIVIR 100 MG: 100 INJECTION, POWDER, LYOPHILIZED, FOR SOLUTION INTRAVENOUS at 11:49

## 2021-01-16 RX ADMIN — ENOXAPARIN SODIUM 30 MG: 30 INJECTION SUBCUTANEOUS at 10:55

## 2021-01-16 RX ADMIN — Medication 2000 UNITS: at 10:54

## 2021-01-16 RX ADMIN — LEVOTHYROXINE SODIUM 50 MCG: 25 TABLET ORAL at 05:14

## 2021-01-16 RX ADMIN — GUAIFENESIN 600 MG: 600 TABLET ORAL at 10:55

## 2021-01-16 RX ADMIN — DOXYCYCLINE 100 MG: 100 CAPSULE ORAL at 11:18

## 2021-01-16 RX ADMIN — BENZONATATE 100 MG: 100 CAPSULE ORAL at 18:54

## 2021-01-16 NOTE — ASSESSMENT & PLAN NOTE
Continue supportive care and weight loss and lifestyle modification  This is a risk factor for COVID infection as she does have obesity

## 2021-01-16 NOTE — PLAN OF CARE

## 2021-01-16 NOTE — PROGRESS NOTES
Progress Note - Ceci Patel 1970, 48 y o  female MRN: [id number]    Unit/Bed#: -01 Encounter: [id number]    Primary Care Provider: Luis Angel Azul MD   Date and time admitted to hospital: 1/14/2021 10:16 AM        Respiratory failure with hypoxia [facility])  Assessment & Plan  With COVID-19 infection-patient was saturating 85% on room air and currently 92-94% on 3-4 L of oxygen by nasal cannula  Will continue oxygen supplement and plan as above on for COVID-19 pneumonia  BMI 39 0-39 9,adult  Assessment & Plan  Continue supportive care and weight loss and lifestyle modification  This is a risk factor for COVID infection as she does have obesity    Acquired hypothyroidism  Assessment & Plan  Continue Synthroid    Prediabetes  Assessment & Plan  Patient is on metformin for prediabetes and will check blood glucose twice a day  Given that her sugars are slightly high will start her on low-dose sliding scale algorithm    * COVID-19 virus infection  Assessment & Plan  Tested positive on 01/21-patient had symptoms cough and fever nausea and vomiting and for the past 3 days increasing shortness of breath and currently needing oxygen  nasal cannula 3-4 L     Patient received by bamlanivumab infusion this past Tuesday on 1/12/21  Will treat COVID-19 on moderate pathway  Will give remdesivir and IV steroids and supplements  Will check inflammatory markers and also I L6 and hepatitis panel and HIV status and QuantiFERON gold test  Will give IV antibiotic with Rocephin doxycycline and Lovenox 30 mg q 12 hours  Consider Actemra if oxygen requirement increases  Will keep close monitoring of oxygen saturation and O2 requirement  1/16/21:  Patient is saturating 91% on 3 to 3 5 L of oxygen via nasal cannula, and desaturations to the mid 80s when she exerts  She continues to feel short of breath on exertion  Denies any other symptoms  No palpitations  No orthopnea or PND  Cough is still present    Appetite is improving        TE Pharmacologic Prophylaxis: Enoxaparin (Lovenox)    Patient Centered Rounds: I have performed bedside rounds with nursing staff today  Discussions with Specialists or Other Care Team Provider:  Nursing  Education and Discussions with Family / Patient:  Spoke with patient  Current Length of Stay: 2 day(s)  Current Patient Status: Inpatient     Certification Statement: The patient will continue to require additional inpatient hospital stay due to COVID-19 virus infection  Discharge Plan / Estimated Discharge Date:  2-3 days    Code Status: Level 1 - Full Code  ______________________________________________________________________________    Subjective:   Patient seen and examined by me  Patient is significantly short of breath with minimal exertion  Continues to have cough but no expectoration  She states she is a little better than yesterday but not 2 months  No chest pain, palpitations or diaphoresis  No high fever, chills or rigors  Does generalized weakness    Objective:   Vitals: Blood pressure 118/66, pulse 55, temperature 97 5 °F (36 4 °C), temperature source Tympanic, resp  rate 18, height 5' 6" (1 676 m), weight 111 kg (245 lb), SpO2 95 %, not currently breastfeeding      Physical Exam:   General appearance: alert, appears stated age and cooperative, looks a little weak  HEENT - atraumatic and normocephalic  Neck- supple  Skin - no fresh rash  Extremities no fresh focal deformities  Cardiovascular- No visible JVD  Respiratory- no accessory muscles of respiration being used  Skin - no fresh rash  Abdomen - no visible mass  CNS- No fresh focal deficits  Psych- no acute psychosis      Additional Data:   Labs:  Results from last 7 days   Lab Units 01/15/21  0443 01/14/21  1046   WBC Thousand/uL 3 40* 4 42   HEMOGLOBIN g/dL 12 4 13 6   HEMATOCRIT % 37 9 39 9   MCV fL 90 88   TOTAL NEUT ABS Thousand/uL 2 72  --    BANDS PCT % 4  --    PLATELETS Thousands/uL 199 203     Results from last 7 days   Lab Units 01/15/21  0443 01/14/21  1046   SODIUM mmol/L 140 138   POTASSIUM mmol/L 3 9 3 3*   CHLORIDE mmol/L 105 100   CO2 mmol/L 26 29   ANION GAP mmol/L 9 9   BUN mg/dL 18 18   CREATININE mg/dL 0 64 0 71   CALCIUM mg/dL 8 6 8 8   ALBUMIN g/dL 3 6 3 9   TOTAL BILIRUBIN mg/dL 0 74 0 88   ALK PHOS U/L 42 9 44 7   ALT U/L 20 23   AST U/L 24 39   EGFR ml/min/1 73sq m 104 100   GLUCOSE RANDOM mg/dL 139 121     Results from last 7 days   Lab Units 01/14/21  1046   MAGNESIUM mg/dL 2 2     Results from last 7 days   Lab Units 01/15/21  0443 01/14/21  1418   CK TOTAL U/L 39 7  --    TROPONIN I ng/mL  --  <0 03     Results from last 7 days   Lab Units 01/15/21  0443   NT-PRO BNP pg/mL 115      Results from last 7 days   Lab Units 01/15/21  0443  01/14/21  1046   LACTIC ACID mmol/L  --   --  1 4   PROCALCITONIN ng/ml 0 13   < >  --     < > = values in this interval not displayed  Results from last 7 days   Lab Units 01/16/21  1122 01/16/21  0822 01/15/21  2115 01/15/21  1615 01/15/21  0855 01/14/21  1634   POC GLUCOSE mg/dl 215* 145* 126 122 151* 175*         Results from last 7 days   Lab Units 01/15/21  0443   TSH 3RD GENERATON uIU/mL 0 751     * I Have Reviewed All Lab Data Listed Above  Cultures:               Imaging:  Imaging Reports Reviewed Today Include:   Xr Chest 1 View Portable    Result Date: 1/14/2021  Impression: Scattered bilateral groundglass opacities consistent with COVID-19 pneumonia   Workstation performed: [id number]     Scheduled Meds:  Current Facility-Administered Medications   Medication Dose Route Frequency Provider Last Rate    ascorbic acid  1,000 mg Oral Q12H Manjit Etienne MD      atorvastatin  40 mg Oral HS Edouard Hernandez MD      benzonatate  100 mg Oral TID PRN Edouard Hernandez MD      cefTRIAXone  1,000 mg Intravenous Q24H Edouard Hernandez MD 1,000 mg (01/16/21 1118)    cholecalciferol  2,000 Units Oral Daily Edouard Hernandez MD     [facility] dexamethasone  6 mg Intravenous Q24H Jose A Sal MD      doxycycline hyclate  100 mg Oral Q12H Jose A Sal MD      enoxaparin  30 mg Subcutaneous Q12H [facility]  Matthew Estrada MD      famotidine  20 mg Oral BID Jose A Sal MD      guaiFENesin  600 mg Oral Q12H [facility]  [address], MD      insulin lispro  1-5 Units Subcutaneous TID [facility] Klaus Bailey MD      insulin lispro  1-5 Units Subcutaneous HS Klaus Bailey MD      levothyroxine  50 mcg Oral Early Morning Jose A Sal MD      zinc sulfate  220 mg Oral Daily Jose A Sal MD      Followed by   Clarissa Gibbons ON 1/21/2021] multivitamin-minerals  1 tablet Oral Daily Jose A Sal MD      remdesivir  100 mg Intravenous Q24H Jose A Sal MD      sodium chloride  125 mL/hr Intravenous Continuous Aminata Courtney PA-C 125 mL/hr (01/16/21 1104)       Klaus Bailey MD  [facility]'s Internal Medicine    ** Please Note: This note has been constructed using a voice recognition system   **

## 2021-01-16 NOTE — ASSESSMENT & PLAN NOTE
Patient is on metformin for prediabetes and will check blood glucose twice a day    Given that her sugars are slightly high will start her on low-dose sliding scale algorithm

## 2021-01-16 NOTE — ASSESSMENT & PLAN NOTE
Tested positive on 01/21-patient had symptoms cough and fever nausea and vomiting and for the past 3 days increasing shortness of breath and currently needing oxygen  nasal cannula 3-4 L     Patient received by bamlanivumab infusion this past Tuesday on 1/12/21  Will treat COVID-19 on moderate pathway  Will give remdesivir and IV steroids and supplements  Will check inflammatory markers and also I L6 and hepatitis panel and HIV status and QuantiFERON gold test  Will give IV antibiotic with Rocephin doxycycline and Lovenox 30 mg q 12 hours  Consider Actemra if oxygen requirement increases  Will keep close monitoring of oxygen saturation and O2 requirement  1/16/21:  Patient is saturating 91% on 3 to 3 5 L of oxygen via nasal cannula, and desaturations to the mid 80s when she exerts  She continues to feel short of breath on exertion  Denies any other symptoms  No palpitations  No orthopnea or PND  Cough is still present    Appetite is improving

## 2021-01-16 NOTE — ASSESSMENT & PLAN NOTE
With COVID-19 infection-patient was saturating 85% on room air and currently 92-94% on 3-4 L of oxygen by nasal cannula  Will continue oxygen supplement and plan as above on for COVID-19 pneumonia

## 2021-01-17 LAB
GLUCOSE SERPL-MCNC: 119 MG/DL (ref 65–140)
GLUCOSE SERPL-MCNC: 140 MG/DL (ref 65–140)
GLUCOSE SERPL-MCNC: 146 MG/DL (ref 65–140)
HIV 1+2 AB+HIV1 P24 AG SERPL QL IA: NORMAL

## 2021-01-17 PROCEDURE — 93005 ELECTROCARDIOGRAM TRACING: CPT

## 2021-01-17 PROCEDURE — 82948 REAGENT STRIP/BLOOD GLUCOSE: CPT

## 2021-01-17 PROCEDURE — 99233 SBSQ HOSP IP/OBS HIGH 50: CPT | Performed by: INTERNAL MEDICINE

## 2021-01-17 RX ADMIN — REMDESIVIR 100 MG: 100 INJECTION, POWDER, LYOPHILIZED, FOR SOLUTION INTRAVENOUS at 12:22

## 2021-01-17 RX ADMIN — OXYCODONE HYDROCHLORIDE AND ACETAMINOPHEN 1000 MG: 500 TABLET ORAL at 23:47

## 2021-01-17 RX ADMIN — DOXYCYCLINE 100 MG: 100 CAPSULE ORAL at 00:49

## 2021-01-17 RX ADMIN — FAMOTIDINE 20 MG: 20 TABLET ORAL at 18:33

## 2021-01-17 RX ADMIN — SODIUM CHLORIDE 125 ML/HR: 0.9 INJECTION, SOLUTION INTRAVENOUS at 15:52

## 2021-01-17 RX ADMIN — DEXAMETHASONE SODIUM PHOSPHATE 6 MG: 4 INJECTION, SOLUTION INTRAMUSCULAR; INTRAVENOUS at 00:50

## 2021-01-17 RX ADMIN — ENOXAPARIN SODIUM 30 MG: 30 INJECTION SUBCUTANEOUS at 23:47

## 2021-01-17 RX ADMIN — BENZONATATE 100 MG: 100 CAPSULE ORAL at 09:43

## 2021-01-17 RX ADMIN — DEXAMETHASONE SODIUM PHOSPHATE 6 MG: 4 INJECTION, SOLUTION INTRAMUSCULAR; INTRAVENOUS at 23:48

## 2021-01-17 RX ADMIN — DOXYCYCLINE 100 MG: 100 CAPSULE ORAL at 23:47

## 2021-01-17 RX ADMIN — SODIUM CHLORIDE 125 ML/HR: 0.9 INJECTION, SOLUTION INTRAVENOUS at 06:30

## 2021-01-17 RX ADMIN — ENOXAPARIN SODIUM 30 MG: 30 INJECTION SUBCUTANEOUS at 09:43

## 2021-01-17 RX ADMIN — LEVOTHYROXINE SODIUM 50 MCG: 25 TABLET ORAL at 06:31

## 2021-01-17 RX ADMIN — DOXYCYCLINE 100 MG: 100 CAPSULE ORAL at 12:22

## 2021-01-17 RX ADMIN — SODIUM CHLORIDE 125 ML/HR: 0.9 INJECTION, SOLUTION INTRAVENOUS at 23:53

## 2021-01-17 RX ADMIN — OXYCODONE HYDROCHLORIDE AND ACETAMINOPHEN 1000 MG: 500 TABLET ORAL at 09:43

## 2021-01-17 RX ADMIN — GUAIFENESIN 600 MG: 600 TABLET ORAL at 09:43

## 2021-01-17 RX ADMIN — FAMOTIDINE 20 MG: 20 TABLET ORAL at 09:43

## 2021-01-17 RX ADMIN — CEFTRIAXONE 1000 MG: 1 INJECTION, SOLUTION INTRAVENOUS at 12:22

## 2021-01-17 RX ADMIN — ZINC SULFATE 220 MG (50 MG) CAPSULE 220 MG: CAPSULE at 09:43

## 2021-01-17 RX ADMIN — GUAIFENESIN 600 MG: 600 TABLET ORAL at 23:48

## 2021-01-17 RX ADMIN — ATORVASTATIN CALCIUM 40 MG: 40 TABLET, FILM COATED ORAL at 23:47

## 2021-01-17 RX ADMIN — Medication 2000 UNITS: at 09:43

## 2021-01-17 NOTE — ASSESSMENT & PLAN NOTE
With COVID-19 infection-patient was saturating 85% on room air and currently 92-94% on 3-4 L of oxygen by nasal cannula  Will continue oxygen supplement and plan as above on for COVID-19 pneumonia      Will try to taper oxygen as best as possible

## 2021-01-17 NOTE — ASSESSMENT & PLAN NOTE
Tested positive on 01/21-patient had symptoms cough and fever nausea and vomiting and for the past 3 days increasing shortness of breath and currently needing oxygen nasal cannula 3-4 L still    Patient received by bamlanivumab infusion this past Tuesday on 1/12/21  Will treat COVID-19 on moderate pathway  Will give remdesivir and IV steroids and supplements  Will check inflammatory markers and also I L6 and hepatitis panel and HIV status and QuantiFERON gold test  Will give IV antibiotic with Rocephin doxycycline and Lovenox 30 mg q 12 hours  Consider Actemra if oxygen requirement increases  Will keep close monitoring of oxygen saturation and O2 requirement  1/16/21:  Patient is saturating 91% on 3 to 3 5 L of oxygen via nasal cannula, and desaturations to the mid 80s when she exerts  She continues to feel short of breath on exertion  Denies any other symptoms  No palpitations  No orthopnea or PND  Cough is still present    Appetite is improving

## 2021-01-17 NOTE — ASSESSMENT & PLAN NOTE
Patient is on metformin for prediabetes and will check blood glucose twice a day    Given that her sugars are slightly high will continue her on low-dose sliding scale algorithm

## 2021-01-17 NOTE — PROGRESS NOTES
Progress Note - Suman Santos 1970, 48 y o  female MRN: [id number]    Unit/Bed#: -01 Encounter: [id number]    Primary Care Provider: Robin Price MD   Date and time admitted to hospital: 1/14/2021 10:16 AM        Respiratory failure with hypoxia [facility])  Assessment & Plan  With COVID-19 infection-patient was saturating 85% on room air and currently 92-94% on 3-4 L of oxygen by nasal cannula  Will continue oxygen supplement and plan as above on for COVID-19 pneumonia  Will try to taper oxygen as best as possible    BMI 39 0-39 9,adult  Assessment & Plan  Continue supportive care and weight loss and lifestyle modification  This is a risk factor for COVID infection as she does have obesity    Acquired hypothyroidism  Assessment & Plan  Continue Synthroid    Prediabetes  Assessment & Plan  Patient is on metformin for prediabetes and will check blood glucose twice a day  Given that her sugars are slightly high will continue her on low-dose sliding scale algorithm      * COVID-19 virus infection  Assessment & Plan  Tested positive on 01/21-patient had symptoms cough and fever nausea and vomiting and for the past 3 days increasing shortness of breath and currently needing oxygen nasal cannula 3-4 L still    Patient received by bamlanivumab infusion this past Tuesday on 1/12/21  Will treat COVID-19 on moderate pathway  Will give remdesivir and IV steroids and supplements  Will check inflammatory markers and also I L6 and hepatitis panel and HIV status and QuantiFERON gold test  Will give IV antibiotic with Rocephin doxycycline and Lovenox 30 mg q 12 hours  Consider Actemra if oxygen requirement increases  Will keep close monitoring of oxygen saturation and O2 requirement  1/16/21:  Patient is saturating 91% on 3 to 3 5 L of oxygen via nasal cannula, and desaturations to the mid 80s when she exerts  She continues to feel short of breath on exertion  Denies any other symptoms  No palpitations    No orthopnea or PND  Cough is still present  Appetite is improving        TE Pharmacologic Prophylaxis: Enoxaparin (Lovenox)    Patient Centered Rounds: I have performed bedside rounds with nursing staff today  Discussions with Specialists or Other Care Team Provider: nurse  Education and Discussions with Family / Patient:  Patient  Current Length of Stay: 3 day(s)  Current Patient Status: Inpatient     Certification Statement: The patient will continue to require additional inpatient hospital stay due to COVID-19 virus infection  Discharge Plan / Estimated Discharge Date:  2-3 days    Code Status: Level 1 - Full Code  ______________________________________________________________________________    Subjective:   Patient seen and examined by me  No chest pain palpitations or diaphoresis  Still continues to have shortness of breath and cough  She feels marginally better today compared to yesterday  Does not have any high fever, chills or rigors  Objective:   Vitals: Blood pressure 132/69, pulse (!) 52, temperature 97 9 °F (36 6 °C), temperature source Tympanic, resp  rate 18, height 5' 6" (1 676 m), weight 111 kg (245 lb), SpO2 95 %, not currently breastfeeding      Physical Exam:   General appearance: alert, appears stated age and cooperative  Constitutional- looks a little weak  HEENT - atraumatic and normocephalic  Neck- supple  Skin - no fresh rash  Extremities no fresh focal deformities  Cardiovascular- S1-S2 heard  Respiratory- bilateral air entry present, no crackles or rhonchi  Skin - no fresh rash  Abdomen - normal bowel sounds present, no rebound tenderness  CNS- No fresh focal deficits  Psych- no acute psychosis     Additional Data:   Labs:  Results from last 7 days   Lab Units 01/15/21  0443 01/14/21  1046   WBC Thousand/uL 3 40* 4 42   HEMOGLOBIN g/dL 12 4 13 6   HEMATOCRIT % 37 9 39 9   MCV fL 90 88   TOTAL NEUT ABS Thousand/uL 2 72  --    BANDS PCT % 4  --    PLATELETS Thousands/uL 199 203 Results from last 7 days   Lab Units 01/15/21  0443 01/14/21  1046   SODIUM mmol/L 140 138   POTASSIUM mmol/L 3 9 3 3*   CHLORIDE mmol/L 105 100   CO2 mmol/L 26 29   ANION GAP mmol/L 9 9   BUN mg/dL 18 18   CREATININE mg/dL 0 64 0 71   CALCIUM mg/dL 8 6 8 8   ALBUMIN g/dL 3 6 3 9   TOTAL BILIRUBIN mg/dL 0 74 0 88   ALK PHOS U/L 42 9 44 7   ALT U/L 20 23   AST U/L 24 39   EGFR ml/min/1 73sq m 104 100   GLUCOSE RANDOM mg/dL 139 121     Results from last 7 days   Lab Units 01/14/21  1046   MAGNESIUM mg/dL 2 2     Results from last 7 days   Lab Units 01/15/21  0443 01/14/21  1418   CK TOTAL U/L 39 7  --    TROPONIN I ng/mL  --  <0 03     Results from last 7 days   Lab Units 01/15/21  0443   NT-PRO BNP pg/mL 115      Results from last 7 days   Lab Units 01/15/21  0443  01/14/21  1046   LACTIC ACID mmol/L  --   --  1 4   PROCALCITONIN ng/ml 0 13   < >  --     < > = values in this interval not displayed  Results from last 7 days   Lab Units 01/17/21  1557 01/17/21  1205 01/17/21  0804 01/16/21  2136 01/16/21  1611 01/16/21  1122 01/16/21  8930 01/15/21  2115 01/15/21  1615 01/15/21  0855 01/14/21  1634   POC GLUCOSE mg/dl 140 119 146* 106 102 215* 145* 126 122 151* 175*         Results from last 7 days   Lab Units 01/15/21  0443   TSH 3RD GENERATON uIU/mL 0 751     * I Have Reviewed All Lab Data Listed Above  Cultures:               Imaging:  Imaging Reports Reviewed Today Include:   Xr Chest 1 View Portable    Result Date: 1/14/2021  Impression: Scattered bilateral groundglass opacities consistent with COVID-19 pneumonia   Workstation performed: [id number]     Scheduled Meds:  Current Facility-Administered Medications   Medication Dose Route Frequency Provider Last Rate    ascorbic acid  1,000 mg Oral Q12H Marlin Guajardo MD      atorvastatin  40 mg Oral HS Chel Gibson MD      benzonatate  100 mg Oral TID PRN Chel Gibson MD      cefTRIAXone  1,000 mg Intravenous Q24H Danika Kirk Lemus MD 1,000 mg (01/17/21 1222)    cholecalciferol  2,000 Units Oral Daily Chel Gibson MD      dexamethasone  6 mg Intravenous Q24H Chel Gibson MD      doxycycline hyclate  100 mg Oral Q12H Chel Gibson MD      enoxaparin  30 mg Subcutaneous Q12H 728  Matthew Estrada MD      famotidine  20 mg Oral BID Chel Gibson MD      guaiFENesin  600 mg Oral Q12H [address], MD      insulin lispro  1-5 Units Subcutaneous TID [facility] Roger Escobar MD      insulin lispro  1-5 Units Subcutaneous HS Roger Escobar MD      levothyroxine  50 mcg Oral Early Morning Chel Gibson MD      zinc sulfate  220 mg Oral Daily Chel Gibson MD      Followed by   Juancarlos Collazo ON 1/21/2021] multivitamin-minerals  1 tablet Oral Daily Chle Gibson MD      remdesivir  100 mg Intravenous Q24H Chel Gibson MD      sodium chloride  125 mL/hr Intravenous Continuous [place], PA-C 125 mL/hr (01/17/21 1552)       Roger Escobar MD  [facility] Internal Medicine    ** Please Note: This note has been constructed using a voice recognition system   **

## 2021-01-18 LAB
ANION GAP SERPL CALCULATED.3IONS-SCNC: 12 MMOL/L (ref 4–13)
BUN SERPL-MCNC: 16 MG/DL (ref 6–20)
CALCIUM SERPL-MCNC: 8.7 MG/DL (ref 8.4–10.2)
CHLORIDE SERPL-SCNC: 102 MMOL/L (ref 96–108)
CO2 SERPL-SCNC: 26 MMOL/L (ref 22–33)
CREAT SERPL-MCNC: 0.58 MG/DL (ref 0.4–1.1)
GAMMA INTERFERON BACKGROUND BLD IA-ACNC: 0.03 IU/ML
GFR SERPL CREATININE-BSD FRML MDRD: 108 ML/MIN/1.73SQ M
GLUCOSE SERPL-MCNC: 117 MG/DL (ref 65–140)
GLUCOSE SERPL-MCNC: 129 MG/DL (ref 65–140)
GLUCOSE SERPL-MCNC: 132 MG/DL (ref 65–140)
GLUCOSE SERPL-MCNC: 137 MG/DL (ref 65–140)
GLUCOSE SERPL-MCNC: 155 MG/DL (ref 65–140)
GLUCOSE SERPL-MCNC: 98 MG/DL (ref 65–140)
M TB IFN-G BLD-IMP: ABNORMAL
M TB IFN-G CD4+ BCKGRND COR BLD-ACNC: 0 IU/ML
M TB IFN-G CD4+ BCKGRND COR BLD-ACNC: 0 IU/ML
MITOGEN IGNF BCKGRD COR BLD-ACNC: <0.1 IU/ML
POTASSIUM SERPL-SCNC: 3.3 MMOL/L (ref 3.5–5)
SODIUM SERPL-SCNC: 140 MMOL/L (ref 133–145)

## 2021-01-18 PROCEDURE — 80048 BASIC METABOLIC PNL TOTAL CA: CPT | Performed by: INTERNAL MEDICINE

## 2021-01-18 PROCEDURE — 82948 REAGENT STRIP/BLOOD GLUCOSE: CPT

## 2021-01-18 PROCEDURE — 99233 SBSQ HOSP IP/OBS HIGH 50: CPT | Performed by: INTERNAL MEDICINE

## 2021-01-18 RX ORDER — POTASSIUM CHLORIDE 20 MEQ/1
40 TABLET, EXTENDED RELEASE ORAL ONCE
Status: COMPLETED | OUTPATIENT
Start: 2021-01-18 | End: 2021-01-18

## 2021-01-18 RX ADMIN — FAMOTIDINE 20 MG: 20 TABLET ORAL at 10:34

## 2021-01-18 RX ADMIN — POTASSIUM CHLORIDE 40 MEQ: 1500 TABLET, EXTENDED RELEASE ORAL at 17:56

## 2021-01-18 RX ADMIN — GUAIFENESIN 600 MG: 600 TABLET ORAL at 20:56

## 2021-01-18 RX ADMIN — ENOXAPARIN SODIUM 30 MG: 30 INJECTION SUBCUTANEOUS at 20:56

## 2021-01-18 RX ADMIN — GUAIFENESIN 600 MG: 600 TABLET ORAL at 10:34

## 2021-01-18 RX ADMIN — ATORVASTATIN CALCIUM 40 MG: 40 TABLET, FILM COATED ORAL at 20:56

## 2021-01-18 RX ADMIN — DOXYCYCLINE 100 MG: 100 CAPSULE ORAL at 13:24

## 2021-01-18 RX ADMIN — CEFTRIAXONE 1000 MG: 1 INJECTION, SOLUTION INTRAVENOUS at 14:35

## 2021-01-18 RX ADMIN — ENOXAPARIN SODIUM 30 MG: 30 INJECTION SUBCUTANEOUS at 13:24

## 2021-01-18 RX ADMIN — Medication 2000 UNITS: at 10:34

## 2021-01-18 RX ADMIN — LEVOTHYROXINE SODIUM 50 MCG: 25 TABLET ORAL at 06:27

## 2021-01-18 RX ADMIN — FAMOTIDINE 20 MG: 20 TABLET ORAL at 17:56

## 2021-01-18 RX ADMIN — OXYCODONE HYDROCHLORIDE AND ACETAMINOPHEN 1000 MG: 500 TABLET ORAL at 10:34

## 2021-01-18 RX ADMIN — REMDESIVIR 100 MG: 100 INJECTION, POWDER, LYOPHILIZED, FOR SOLUTION INTRAVENOUS at 13:24

## 2021-01-18 RX ADMIN — OXYCODONE HYDROCHLORIDE AND ACETAMINOPHEN 1000 MG: 500 TABLET ORAL at 20:55

## 2021-01-18 RX ADMIN — ZINC SULFATE 220 MG (50 MG) CAPSULE 220 MG: CAPSULE at 10:34

## 2021-01-18 RX ADMIN — INSULIN LISPRO 1 UNITS: 100 INJECTION, SOLUTION INTRAVENOUS; SUBCUTANEOUS at 10:34

## 2021-01-18 NOTE — ASSESSMENT & PLAN NOTE
With COVID-19 infection-patient was saturating 85% on room air at admission and currently 92-94% on 3 L of oxygen by nasal cannula  Will continue oxygen supplement and plan as above on for COVID-19 pneumonia      Will try to taper oxygen as best as possible

## 2021-01-18 NOTE — ASSESSMENT & PLAN NOTE
Patient is on metformin for prediabetes and will check blood glucose twice a day    Given that her sugars are slightly high will continue her on low-dose sliding scale algorithm  Blood sugars are in the 100-200 range

## 2021-01-18 NOTE — ASSESSMENT & PLAN NOTE
Tested positive on 01/21-patient had symptoms cough and fever nausea and vomiting and for the past 3 days increasing shortness of breath and currently needing oxygen nasal cannula 3-4 L still    Patient received by bamlanivumab infusion this past Tuesday on 1/12/21  Continue COVID-19 on moderate pathway  Continue remdesivir and IV steroids and supplements- tomorrow 1/19 will be last day of remdesivir  Was on Rocephin, doxycycline and these were stopped as procalcitonin was negative  Lovenox 30 mg q 12 hours  Consider Actemra if oxygen requirement increases  Will keep close monitoring of oxygen saturation and O2 requirement  1/18/21:  Patient is saturating 91- 92% on 3 L of oxygen via nasal cannula, and desaturations to the mid 80s when she exerts  She continues to feel short of breath on exertion  Denies any other symptoms  No palpitations  No orthopnea or PND  Cough is still present    Appetite is improving

## 2021-01-18 NOTE — PROGRESS NOTES
Progress Note - Timbo Fairbanks 1970, 48 y o  female MRN: [id number]    Unit/Bed#: -01 Encounter: [id number]    Primary Care Provider: Nadja Rivero MD   Date and time admitted to hospital: 1/14/2021 10:16 AM        Respiratory failure with hypoxia ([address] )  Assessment & Plan  With COVID-19 infection-patient was saturating 85% on room air at admission and currently 92-94% on 3 L of oxygen by nasal cannula  Will continue oxygen supplement and plan as above on for COVID-19 pneumonia  Will try to taper oxygen as best as possible    BMI 39 0-39 9,adult  Assessment & Plan  Continue supportive care and weight loss and lifestyle modification  This is a risk factor for COVID infection as she does have obesity    Acquired hypothyroidism  Assessment & Plan  Continue Synthroid    Prediabetes  Assessment & Plan  Patient is on metformin for prediabetes and will check blood glucose twice a day  Given that her sugars are slightly high will continue her on low-dose sliding scale algorithm  Blood sugars are in the 100-200 range      * COVID-19 virus infection  Assessment & Plan  Tested positive on 01/21-patient had symptoms cough and fever nausea and vomiting and for the past 3 days increasing shortness of breath and currently needing oxygen nasal cannula 3-4 L still    Patient received by bamlanivumab infusion this past Tuesday on 1/12/21  Continue COVID-19 on moderate pathway  Continue remdesivir and IV steroids and supplements- tomorrow 1/19 will be last day of remdesivir  Was on Rocephin, doxycycline and these were stopped as procalcitonin was negative  Lovenox 30 mg q 12 hours  Consider Actemra if oxygen requirement increases  Will keep close monitoring of oxygen saturation and O2 requirement  1/18/21:  Patient is saturating 91- 92% on 3 L of oxygen via nasal cannula, and desaturations to the mid 80s when she exerts  She continues to feel short of breath on exertion  Denies any other symptoms    No palpitations  No orthopnea or PND  Cough is still present  Appetite is improving        TE Pharmacologic Prophylaxis: Enoxaparin (Lovenox)    Patient Centered Rounds: I have performed bedside rounds with nursing staff today  Discussions with Specialists or Other Care Team Provider:  Nursing  Education and Discussions with Family / Patient:  Spoke to patient  Current Length of Stay: 4 day(s)  Current Patient Status: Inpatient     Certification Statement: The patient will continue to require additional inpatient hospital stay due to COVID-19 virus infection  Discharge Plan / Estimated Discharge Date:  2 days    Code Status: Level 1 - Full Code  ______________________________________________________________________________    Subjective:   Patient seen and examined by me  She is still short of breath shortness of breath is present at rest and increases with minimal exertion  She states that she is feeling a little better today compared to before  No chest pain, palpitations or diaphoresis at this point of time  Patient does have weakness of the weakness is generalized  Patient does not have any high fever, chills or rigors  Continues to have dry cough    Objective:   Vitals: Blood pressure 115/61, pulse 56, temperature 97 7 °F (36 5 °C), temperature source Tympanic, resp  rate 16, height 5' 6" (1 676 m), weight 111 kg (245 lb), SpO2 96 %, not currently breastfeeding      Physical Exam:   General appearance: alert, appears stated age and cooperative  Constitutional- looks a little weak  HEENT - atraumatic and normocephalic  Neck- supple  Skin - no fresh rash  Extremities no fresh focal deformities  Cardiovascular- S1-S2 heard  Respiratory- bilateral air entry present, no crackles or rhonchi  Skin - no fresh rash  Abdomen - normal bowel sounds present, no rebound tenderness  CNS- No fresh focal deficits  Psych- no acute psychosis     Additional Data:   Labs:  Results from last 7 days   Lab Units 01/15/21  1553 01/14/21  1046   WBC Thousand/uL 3 40* 4 42   HEMOGLOBIN g/dL 12 4 13 6   HEMATOCRIT % 37 9 39 9   MCV fL 90 88   TOTAL NEUT ABS Thousand/uL 2 72  --    BANDS PCT % 4  --    PLATELETS Thousands/uL 199 203     Results from last 7 days   Lab Units 01/18/21  1339 01/15/21  0443 01/14/21  1046   SODIUM mmol/L 140 140 138   POTASSIUM mmol/L 3 3* 3 9 3 3*   CHLORIDE mmol/L 102 105 100   CO2 mmol/L 26 26 29   ANION GAP mmol/L 12 9 9   BUN mg/dL 16 18 18   CREATININE mg/dL 0 58 0 64 0 71   CALCIUM mg/dL 8 7 8 6 8 8   ALBUMIN g/dL  --  3 6 3 9   TOTAL BILIRUBIN mg/dL  --  0 74 0 88   ALK PHOS U/L  --  42 9 44 7   ALT U/L  --  20 23   AST U/L  --  24 39   EGFR ml/min/1 73sq m 108 104 100   GLUCOSE RANDOM mg/dL 137 139 121     Results from last 7 days   Lab Units 01/14/21  1046   MAGNESIUM mg/dL 2 2     Results from last 7 days   Lab Units 01/15/21  0443 01/14/21  1418   CK TOTAL U/L 39 7  --    TROPONIN I ng/mL  --  <0 03     Results from last 7 days   Lab Units 01/15/21  0443   NT-PRO BNP pg/mL 115      Results from last 7 days   Lab Units 01/15/21  0443  01/14/21  1046   LACTIC ACID mmol/L  --   --  1 4   PROCALCITONIN ng/ml 0 13   < >  --     < > = values in this interval not displayed  Results from last 7 days   Lab Units 01/18/21  1133 01/18/21  0815 01/17/21  2346 01/17/21  1557 01/17/21  1205 01/17/21  0804 01/16/21  2136 01/16/21  1611 01/16/21  1122 01/16/21  0822 01/15/21  2115 01/15/21  1615   POC GLUCOSE mg/dl 129 155* 98 140 119 146* 106 102 215* 145* 126 122         Results from last 7 days   Lab Units 01/15/21  0443   TSH 3RD GENERATON uIU/mL 0 751     * I Have Reviewed All Lab Data Listed Above  Cultures:               Imaging:  Imaging Reports Reviewed Today Include:   Xr Chest 1 View Portable    Result Date: 1/14/2021  Impression: Scattered bilateral groundglass opacities consistent with COVID-19 pneumonia   Workstation performed: [id number]     Scheduled Meds:  Current Facility-Administered Medications   Medication Dose Route Frequency Provider Last Rate    ascorbic acid  1,000 mg Oral Q12H Alfornia Ganser, MD      atorvastatin  40 mg Oral HS Jose A Sal MD      benzonatate  100 mg Oral TID PRN Jose A Sal MD      cholecalciferol  2,000 Units Oral Daily Jose A Sal MD      dexamethasone  6 mg Intravenous Q24H Jose A Sal MD      enoxaparin  30 mg Subcutaneous Q12H Alfornia Ganser, MD      famotidine  20 mg Oral BID Jose A Sal MD      guaiFENesin  600 mg Oral Q12H Alfornia Ganser, MD      insulin lispro  1-5 Units Subcutaneous TID [facility] Klaus Bailey MD      insulin lispro  1-5 Units Subcutaneous HS Klaus Bailey MD      levothyroxine  50 mcg Oral Early Morning Jose A Sal MD      zinc sulfate  220 mg Oral Daily Jose A Sal MD      Followed by   Clarissa Gibbons ON 1/21/2021] multivitamin-minerals  1 tablet Oral Daily Jose A Sal MD      potassium chloride  40 mEq Oral Once Klaus Bailey MD      sodium chloride  125 mL/hr Intravenous Continuous Aminata Courtney PA-C Stopped (01/18/21 1035)       Klaus Bailey MD  [facility]    ** Please Note: This note has been constructed using a voice recognition system   **

## 2021-01-19 LAB
GLUCOSE SERPL-MCNC: 150 MG/DL (ref 65–140)
GLUCOSE SERPL-MCNC: 176 MG/DL (ref 65–140)
IL6 SERPL-MCNC: <2.5 PG/ML (ref 0–13)

## 2021-01-19 PROCEDURE — 94761 N-INVAS EAR/PLS OXIMETRY MLT: CPT

## 2021-01-19 PROCEDURE — 82948 REAGENT STRIP/BLOOD GLUCOSE: CPT

## 2021-01-19 PROCEDURE — 99233 SBSQ HOSP IP/OBS HIGH 50: CPT | Performed by: INTERNAL MEDICINE

## 2021-01-19 RX ADMIN — DEXAMETHASONE SODIUM PHOSPHATE 6 MG: 4 INJECTION, SOLUTION INTRAMUSCULAR; INTRAVENOUS at 00:22

## 2021-01-19 RX ADMIN — FAMOTIDINE 20 MG: 20 TABLET ORAL at 11:16

## 2021-01-19 RX ADMIN — OXYCODONE HYDROCHLORIDE AND ACETAMINOPHEN 1000 MG: 500 TABLET ORAL at 11:16

## 2021-01-19 RX ADMIN — Medication 2000 UNITS: at 11:16

## 2021-01-19 RX ADMIN — OXYCODONE HYDROCHLORIDE AND ACETAMINOPHEN 1000 MG: 500 TABLET ORAL at 22:43

## 2021-01-19 RX ADMIN — INSULIN LISPRO 1 UNITS: 100 INJECTION, SOLUTION INTRAVENOUS; SUBCUTANEOUS at 11:20

## 2021-01-19 RX ADMIN — ATORVASTATIN CALCIUM 40 MG: 40 TABLET, FILM COATED ORAL at 22:43

## 2021-01-19 RX ADMIN — GUAIFENESIN 600 MG: 600 TABLET ORAL at 22:43

## 2021-01-19 RX ADMIN — ENOXAPARIN SODIUM 30 MG: 30 INJECTION SUBCUTANEOUS at 11:16

## 2021-01-19 RX ADMIN — LEVOTHYROXINE SODIUM 50 MCG: 25 TABLET ORAL at 05:57

## 2021-01-19 RX ADMIN — INSULIN LISPRO 1 UNITS: 100 INJECTION, SOLUTION INTRAVENOUS; SUBCUTANEOUS at 11:18

## 2021-01-19 RX ADMIN — ZINC SULFATE 220 MG (50 MG) CAPSULE 220 MG: CAPSULE at 11:16

## 2021-01-19 RX ADMIN — GUAIFENESIN 600 MG: 600 TABLET ORAL at 11:16

## 2021-01-19 RX ADMIN — FAMOTIDINE 20 MG: 20 TABLET ORAL at 18:16

## 2021-01-19 RX ADMIN — ENOXAPARIN SODIUM 30 MG: 30 INJECTION SUBCUTANEOUS at 22:42

## 2021-01-19 NOTE — PROGRESS NOTES
Progress Note - Sharla Brunner 1970, 48 y o  female MRN: [id number]    Unit/Bed#: -01 Encounter: [id number]    Primary Care Provider: Rylan Petty MD   Date and time admitted to hospital: 1/14/2021 10:16 AM        * COVID-19 virus infection  Assessment & Plan  Tested positive on 01/21-patient had symptoms cough and fever nausea and vomiting and for the past 3 days increasing shortness of breath and currently needing oxygen nasal cannula 3-4 L still    Patient received by bamlanivumab infusion this past Tuesday on 1/12/21  Continue COVID-19 on moderate pathway  Continue remdesivir and IV steroids and supplements- tomorrow 1/19 will be last day of remdesivir  Was on Rocephin, doxycycline and these were stopped as procalcitonin was negative  Lovenox 30 mg q 12 hours  Consider Actemra if oxygen requirement increases  Will keep close monitoring of oxygen saturation and O2 requirement  Home oxygen protocol was done and patient is saturating 94% on room air and 91% on exertion  Patient is currently on 2 L of oxygen saturating 94%  Will titrate oxygen down and monitor oxygen saturation  Clinically much improved  Respiratory failure with hypoxia ([facility] [address] )  Assessment & Plan  With COVID-19 infection-will titrate oxygen down to maintain O2 sat more than 92%  BMI 39 0-39 9,adult  Assessment & Plan  Continue supportive care and weight loss and lifestyle modification  This is a risk factor for COVID infection as she does have obesity    Acquired hypothyroidism  Assessment & Plan  Continue Synthroid            Subjective/Objective     Subjective:   Patient is feeling better  Patient is afebrile  Patient states she feels a lot better cough has improved  No acute overnight events noted  Patient has no abdominal pain nausea vomiting or diarrhea  Objective:  Vitals: Blood pressure 99/59, pulse (!) 51, temperature (!) 96 9 °F (36 1 °C), temperature source Tympanic, resp   rate 20, height 5' 6" (1 676 m), weight 111 kg (245 lb), SpO2 92 %, not currently breastfeeding  ,Body mass index is 39 54 kg/m²  No intake or output data in the 24 hours ending 01/19/21 1452    Invasive Devices     Peripheral Intravenous Line            Peripheral IV 01/18/21 Right Forearm less than 1 day                Physical Exam:  HEENT-PERRLA, moist oral mucosa  Neck-supple, no JVD elevation   Respiratory-equal air entry bilaterally, no rales or rhonchi  Cardiovascular system-S1, S2 heard, no murmur or gallops or rubs  Abdomen-soft, nontender, no guarding or rigidity, bowel sounds heard  Extremities-no pedal edema  Peripheral pulses palpable  Musculoskeletal-no contractures  Central nervous system-no acute focal neurological deficit ,no sensory or motor deficit noted  Skin-no rash noted        Lab, Imaging and other studies: I have personally reviewed pertinent reports      VTE Pharmacologic Prophylaxis: Enoxaparin (Lovenox)  VTE Mechanical Prophylaxis: sequential compression device

## 2021-01-19 NOTE — UTILIZATION REVIEW
Continued Stay Review    Date: 1/18/21 Tuesday                          Current Patient Class: Inpatient   Current Level of Care: Acute Med Surg      HPI:  48year old female with PMHx Obesity/ BMI 39 5, pre-diabetes, hypothyroidism, positive COVID test 1/17/21 and received  Bamlanivimab infusion on 1/12/21  Presented via EMS to [facility] ED on 1/14/21 2nd worsening SOB, cough, congestion, dizziness and lightheadedness with "feeling of passing out" but no LOC,  severe weakness, fatigue, body aches, lower abdominal discomfort with nausea, vomiting x 2-3 and diarrhea  In ED - Temp 99 2  /48  Rm Air SpO2 85 % - improved to 91 - 94 % with O2 at 3-4 L   Chest X ray shows scattered bilateral groundglass opacities consistent with COVID-19 pneumonia  Labs:  K+ 3 3  CRP 6 8 Procalcitonin 0 3  EDTX: Nc O2, IVF NSS, IV Remdesivir, IV Rocephin, IV Decadron, Doxycycline  Admitted inpatient 1/14/21 at 1145 2nd COVID-19 virus infection/ Pneumonia - Tx:  moderate COVID Pathway, O2 prn, IV remdesivir and IV steroids, IV Rocephin, vit supplements, monitor  inflammatory markers, self proning as able, close monitoring of oxygen saturation and O2 requirement  1/16/21  Respiratory failure with hypoxia / COVID-19 INFX/ Pneumonia  COVID-19 infection-patient was saturating 85% on room air and currently 92-94% on 3-4 L of oxygen by nasal cannula  Saturating 91% on 3 to 3 5 L of oxygen via nasal cannula, and desaturations to the mid 80s when she exerts  She continues to feel short of breath on exertion    Cough is still present  Appetite improving       1/18/21   Currently 92-94% on 3 - 3 1/2  L of Oxygen by nasal cannula - desaturations to the mid 80s with exertion  Continues to feel short of breath on exertion  Continue COVID-19 on moderate pathway     Continue remdesivir and IV steroids and supplements - today will be last day of Remdesivir    INT MED Certification Statement:   will continue to require additional inpatient hospital stay due to COVID-19 virus infection    Pertinent Labs/Diagnostic Results:     Results from last 7 days   Lab Units 01/15/21  0443 01/14/21  1046   WBC Thousand/uL 3 40* 4 42   HEMOGLOBIN g/dL 12 4 13 6   HEMATOCRIT % 37 9 39 9   PLATELETS Thousands/uL 199 203   NEUTROS ABS Thousands/µL  --  2 88   BANDS PCT % 4  --      Results from last 7 days   Lab Units 01/18/21  1339 01/15/21  0443 01/14/21  1046   SODIUM mmol/L 140 140 138   POTASSIUM mmol/L 3 3* 3 9 3 3*   CHLORIDE mmol/L 102 105 100   CO2 mmol/L 26 26 29   ANION GAP mmol/L 12 9 9   BUN mg/dL 16 18 18   CREATININE mg/dL 0 58 0 64 0 71   EGFR ml/min/1 73sq m 108 104 100   CALCIUM mg/dL 8 7 8 6 8 8   MAGNESIUM mg/dL  --   --  2 2     Results from last 7 days   Lab Units 01/15/21  0443 01/14/21  1046   AST U/L 24 39   ALT U/L 20 23   ALK PHOS U/L 42 9 44 7   TOTAL PROTEIN g/dL 7 1 7 5   ALBUMIN g/dL 3 6 3 9   TOTAL BILIRUBIN mg/dL 0 74 0 88     Results from last 7 days   Lab Units 01/18/21  2036 01/18/21  1635 01/18/21  1133 01/18/21  0815 01/17/21  2346 01/17/21  1557 01/17/21  1205 01/17/21  0804 01/16/21  2136 01/16/21  1611   POC GLUCOSE mg/dl 117 132 129 155* 98 140 119 146* 106 102     Results from last 7 days   Lab Units 01/18/21  1339 01/15/21  0443 01/14/21  1046   GLUCOSE RANDOM mg/dL 137 139 121     Results from last 7 days   Lab Units 01/15/21  0443   CK TOTAL U/L 39 7     Results from last 7 days   Lab Units 01/14/21  1418   TROPONIN I ng/mL <0 03     Results from last 7 days   Lab Units 01/15/21  0443 01/14/21  1108   D-DIMER QUANTITATIVE mg/L FEU 0 36 0 50*     Results from last 7 days   Lab Units 01/15/21  0443   TSH 3RD GENERATON uIU/mL 0 751     Results from last 7 days   Lab Units 01/15/21  0443 01/14/21  1047   PROCALCITONIN ng/ml 0 13 0 30*     Results from last 7 days   Lab Units 01/15/21  0443 01/14/21  1046   FERRITIN ng/mL 407* 523*     Results from last 7 days   Lab Units 01/15/21  0443 01/14/21  1046   CRP mg/L 4 4* 6 8*       Vital Signs:   Blood pressure 115/61, pulse 56, temperature 97 7 °F (36 5 °C), temperature source Tympanic, resp  rate 16,   SpO2 96 % with 3 - 3 5 L NC O2  height 5' 6" (1 676 m), weight 111 kg (245 lb)      01/17 0701   01/18 0700   I V  (mL/kg) 2000 (18)   Total Intake(mL/kg) 2000 (18)   Net +2000     Diet Karl/CHO Controlled; Consistent Carbohydrate Diet Level 2 (5 carb servings/75 grams CHO/meal)     Scheduled Medications:  ascorbic acid, 1,000 mg, Oral, Q12H GIOVANI  atorvastatin, 40 mg, Oral, HS  cholecalciferol, 2,000 Units, Oral, Daily  IV dexamethasone, 6 mg, Intravenous, Q24H  enoxaparin, 30 mg, Subcutaneous, Q12H GIOVANI  famotidine, 20 mg, Oral, BID  guaiFENesin, 600 mg, Oral, Q12H GIOVANI  insulin lispro, 1-5 Units, Subcutaneous, TID AC  insulin lispro, 1-5 Units, Subcutaneous, HS  levothyroxine, 50 mcg, Oral, Early Morning  zinc sulfate, 220 mg, Oral, Daily  [START ON 1/21/2021] multivitamin-minerals, 1 tablet, Oral, Daily    IV remdesivir (Veklury) 100 mg in sodium chloride 0 9 % 250 mL IVPB    Ordered Dose: 100 mg Route: Intravenous Frequency: Every 24 hours over 30 Minutes   Scheduled Start Date/Time: 01/15/21 1215 End Date/Time: 01/18/21 1755 after 4 doses       Continuous IV Infusions:  IVF sodium chloride 0 9 % infusion     Ordered Dose: 125 mL/hr Route: Intravenous Frequency: Continuous @ 125 mL/hr   Scheduled Start Date/Time: 01/14/21 1045         PRN Meds:  benzonatate, 100 mg, Oral, TID PRN GIVEN X 1/ 24 HRS    Discharge Plan: To be determined   Inpatient Case Management following for all discharge needs    Network Utilization Review Department  ATTENTION: Please call with any questions or concerns to [phone] and carefully listen to the prompts so that you are directed to the right person   All voicemails are confidential   Kev Pronto all requests for admission clinical reviews, approved or denied determinations and any other requests to dedicated fax number below belonging to the [facility] where the patient is receiving treatment   List of dedicated fax numbers for the Facilities:  [address] [place] [address] DENIALS (Administrative/Medical Necessity) [phone]   [address]  (Maternity/NICU/Pediatrics) [phone] [address] Paul Byrnes 0429 (  Trupti Pastor "[address]) [address] [place] [address] Talha Abdoulaye [address] [place] [address] [phone]

## 2021-01-19 NOTE — ASSESSMENT & PLAN NOTE
Tested positive on 01/21-patient had symptoms cough and fever nausea and vomiting and for the past 3 days increasing shortness of breath and currently needing oxygen nasal cannula 3-4 L still    Patient received by bamlanivumab infusion this past Tuesday on 1/12/21  Continue COVID-19 on moderate pathway  Continue remdesivir and IV steroids and supplements- tomorrow 1/19 will be last day of remdesivir  Was on Rocephin, doxycycline and these were stopped as procalcitonin was negative  Lovenox 30 mg q 12 hours  Consider Actemra if oxygen requirement increases  Will keep close monitoring of oxygen saturation and O2 requirement  Home oxygen protocol was done and patient is saturating 94% on room air and 91% on exertion  Patient is currently on 2 L of oxygen saturating 94%  Will titrate oxygen down and monitor oxygen saturation  Clinically much improved

## 2021-01-19 NOTE — PLAN OF CARE
Problem: INFECTION - ADULT  Goal: Absence or prevention of progression during hospitalization  Description: INTERVENTIONS:  - Assess and monitor for signs and symptoms of infection  - Monitor lab/diagnostic results  - Monitor all insertion sites, i e  indwelling lines, tubes, and drains  - [place] appropriate cooling/warming therapies per order  - Administer medications as ordered  - Instruct and encourage patient and family to use good hand hygiene technique  - Identify and instruct in appropriate isolation precautions for identified infection/condition  Outcome: Progressing     Problem: RESPIRATORY - ADULT  Goal: Achieves optimal ventilation and oxygenation  Description: INTERVENTIONS:  - Assess for changes in respiratory status  - Assess for changes in mentation and behavior  - Position to facilitate oxygenation and minimize respiratory effort  - Oxygen administered by appropriate delivery if ordered  - Initiate smoking cessation education as indicated  - Encourage broncho-pulmonary hygiene including cough, deep breathe, Incentive Spirometry  - Assess and instruct to report SOB or any respiratory difficulty  - Respiratory Therapy support as indicated  Outcome: Progressing     Problem: METABOLIC, FLUID AND ELECTROLYTES - ADULT  Goal: Electrolytes maintained within normal limits  Description: INTERVENTIONS:  - Monitor labs and assess patient for signs and symptoms of electrolyte imbalances  - Administer electrolyte replacement as ordered  - Monitor response to electrolyte replacements, including repeat lab results as appropriate  - Instruct patient on fluid and nutrition as appropriate  Outcome: Progressing

## 2021-01-19 NOTE — RESPIRATORY THERAPY NOTE
Home Oxygen Qualifying Test       Patient name: Cedrick Gonsales        : 1970   Date of Test:  2021  Diagnosis:      Home Oxygen Test:    **Medicare Guidelines require item(s) 1-5 on all ambulatory patients or 1 and 2 on non-ambulatory patients  1   Baseline SPO2 on Room Air at rest 94 %  2   SPO2 during exercise on Room Air 90 %  During exercise monitor SpO2  If SPO2 increases >=89% with ambulation do not add supplemental oxygen  If <= 88% on room air add O2 via NC and titrate patient  Patient must be ambulated with O2 and titrated to > 88% with exertion  3   SPO2 on Oxygen at rest 97 % 3 lpm     4   SPO2 during exercise on Oxygen N/A % a liter flow of N/A lpm     5   Exercise performed:          walking, distance 150 (feet)          []  Supplemental Home Oxygen is indicated  [x]  Client does not qualify for home oxygen  Respiratory Additional Notes- Found pt on 3 L with an SPO2 of 97% at rest  Took NC off and had pt on RA  SPO2 on RA was 94%  Walked pt inside her room due to Covid positive results  Pt was able to maintain oxygen saturations on RA  Pt did not dropped below 90%  At this time, pt does not qualify for home oxygen      Ector De La Cruz, RT

## 2021-01-20 ENCOUNTER — TRANSITIONAL CARE MANAGEMENT (OUTPATIENT)
Dept: FAMILY MEDICINE CLINIC | Facility: CLINIC | Age: 51
End: 2021-01-20

## 2021-01-20 VITALS
DIASTOLIC BLOOD PRESSURE: 57 MMHG | OXYGEN SATURATION: 93 % | WEIGHT: 245 LBS | HEIGHT: 66 IN | BODY MASS INDEX: 39.37 KG/M2 | TEMPERATURE: 97.5 F | HEART RATE: 46 BPM | SYSTOLIC BLOOD PRESSURE: 124 MMHG | RESPIRATION RATE: 16 BRPM

## 2021-01-20 LAB
ATRIAL RATE: 46 BPM
GLUCOSE SERPL-MCNC: 100 MG/DL (ref 65–140)
GLUCOSE SERPL-MCNC: 117 MG/DL (ref 65–140)
GLUCOSE SERPL-MCNC: 129 MG/DL (ref 65–140)
P AXIS: 33 DEGREES
PR INTERVAL: 154 MS
QRS AXIS: -13 DEGREES
QRSD INTERVAL: 94 MS
QT INTERVAL: 559 MS
QTC INTERVAL: 490 MS
T WAVE AXIS: -27 DEGREES
VENTRICULAR RATE: 46 BPM

## 2021-01-20 PROCEDURE — 82948 REAGENT STRIP/BLOOD GLUCOSE: CPT

## 2021-01-20 PROCEDURE — 94761 N-INVAS EAR/PLS OXIMETRY MLT: CPT

## 2021-01-20 PROCEDURE — 99239 HOSP IP/OBS DSCHRG MGMT >30: CPT | Performed by: INTERNAL MEDICINE

## 2021-01-20 PROCEDURE — 93010 ELECTROCARDIOGRAM REPORT: CPT | Performed by: INTERNAL MEDICINE

## 2021-01-20 RX ORDER — BENZONATATE 100 MG/1
100 CAPSULE ORAL 3 TIMES DAILY PRN
Qty: 20 CAPSULE | Refills: 0 | Status: SHIPPED | OUTPATIENT
Start: 2021-01-20 | End: 2021-05-24

## 2021-01-20 RX ORDER — MELATONIN
2000 DAILY
Qty: 30 EACH | Refills: 0 | Status: SHIPPED | OUTPATIENT
Start: 2021-01-20 | End: 2022-03-24

## 2021-01-20 RX ORDER — ATORVASTATIN CALCIUM 40 MG/1
40 TABLET, FILM COATED ORAL
Qty: 30 TABLET | Refills: 0 | Status: SHIPPED | OUTPATIENT
Start: 2021-01-20 | End: 2021-05-24

## 2021-01-20 RX ORDER — PREDNISONE 20 MG/1
40 TABLET ORAL DAILY
Qty: 3 TABLET | Refills: 0 | Status: SHIPPED | OUTPATIENT
Start: 2021-01-20 | End: 2021-01-28

## 2021-01-20 RX ORDER — ZINC SULFATE 50(220)MG
220 CAPSULE ORAL DAILY
Qty: 1 CAPSULE | Refills: 0 | Status: SHIPPED | OUTPATIENT
Start: 2021-01-20 | End: 2021-05-24

## 2021-01-20 RX ORDER — FAMOTIDINE 20 MG/1
20 TABLET, FILM COATED ORAL 2 TIMES DAILY
Qty: 30 TABLET | Refills: 0 | Status: SHIPPED | OUTPATIENT
Start: 2021-01-20 | End: 2021-05-24

## 2021-01-20 RX ADMIN — Medication 2000 UNITS: at 08:37

## 2021-01-20 RX ADMIN — FAMOTIDINE 20 MG: 20 TABLET ORAL at 08:37

## 2021-01-20 RX ADMIN — ZINC SULFATE 220 MG (50 MG) CAPSULE 220 MG: CAPSULE at 08:37

## 2021-01-20 RX ADMIN — ENOXAPARIN SODIUM 30 MG: 30 INJECTION SUBCUTANEOUS at 08:37

## 2021-01-20 RX ADMIN — DEXAMETHASONE SODIUM PHOSPHATE 6 MG: 4 INJECTION, SOLUTION INTRAMUSCULAR; INTRAVENOUS at 00:31

## 2021-01-20 RX ADMIN — OXYCODONE HYDROCHLORIDE AND ACETAMINOPHEN 1000 MG: 500 TABLET ORAL at 08:37

## 2021-01-20 RX ADMIN — GUAIFENESIN 600 MG: 600 TABLET ORAL at 08:37

## 2021-01-20 RX ADMIN — LEVOTHYROXINE SODIUM 50 MCG: 25 TABLET ORAL at 06:03

## 2021-01-20 NOTE — DISCHARGE SUMMARY
Discharge- Itzel Herman 1970, 48 y o  female MRN: [id number]    Unit/Bed#: -01 Encounter: [id number]    Primary Care Provider: Adriana Cagle MD   Date and time admitted to hospital: 1/14/2021 10:16 AM        * COVID-19 virus infection  Assessment & Plan  Tested positive on 01/21-patient had symptoms cough and fever nausea and vomiting and for the past 3 days increasing shortness of breath and currently needing oxygen nasal cannula 3-4 L still    Patient received by bamlanivumab infusion this past Tuesday on 1/12/21  Continue COVID-19 on moderate pathway  Continue remdesivir and IV steroids and supplements-patient completed remdesivir  Was on Rocephin, doxycycline and these were stopped as procalcitonin was negative  Will keep close monitoring of oxygen saturation and O2 requirement  Home oxygen protocol was done and patient is saturating 94% on room air and 91% on exertion  Patient is currently on 2 L of oxygen saturating 94%  Will titrate oxygen down and monitor oxygen saturation  Clinically much improved  Will discharge on 3 more days prednisone and recheck for home oxygen evaluation and also will give script for Pepcid and statin and supplements  Respiratory failure with hypoxia ([address] )  Assessment & Plan  With COVID-19 infection-will titrate oxygen down to maintain O2 sat more than 92%  BMI 39 0-39 9,adult  Assessment & Plan  Continue supportive care and weight loss and lifestyle modification  This is a risk factor for COVID infection as she does have obesity    Acquired hypothyroidism  Assessment & Plan  Continue Synthroid    Prediabetes  Assessment & Plan  Patient is on metformin for prediabetes and will check blood glucose twice a day    Given that her sugars are slightly high will continue her on low-dose sliding scale algorithm  Blood sugars are in the 100-200 range                    Resolved Problems  Date Reviewed: 1/16/2021    None          Admission Date:   Admission Orders (From admission, onward)     Ordered        01/14/21 1145  Inpatient Admission  Once                     Admitting Diagnosis: SOB (shortness of breath) [R06 02]  Hypoxia [R09 02]  COVID-19 [U07 1]    HPI: Bernadette Riley is a 48 y o  female who presents with pre diabetes, hypothyroidism, obesity with BMI of 39 5 presents with complaints of shortness of breath and cough  Patient stated that she had symptoms with fever cough and generalized weakness last Tuesday and she was tested positive on Wednesday  Patient however continued to have increasing shortness of breath and cough over the weekend and she felt very weak and tired with generalized body aches  Patient also had nausea and vomiting at least 2-3 times containing food material   Patient felt dizzy and lightheaded and almost feeling of passing out but no loss of consciousness  Patient received [place] no map infusion on this past Tuesday  However continue to have increasing shortness of breath and today she presented with oxygen saturation of 85% on room air and was placed on 3-4 L of oxygen by nasal cannula saturating 93-95%  Patient received IV fluid hydration and IV steroid and is being admitted for further evaluation and management  Patient denies of chest pain or palpitations  Patient complains of pain in the right lower quadrant mild abdominal discomfort  Patient also has been having intermittent diarrhea  Procedures Performed: No orders of the defined types were placed in this encounter  Summary of Hospital Course:  Patient was admitted cough and fever and nausea vomiting had tested positive for COVID-19 infection on 01/07  Patient needed 3-4 L of oxygen supplementation by nasal cannula at the time of admission  Patient had bamlanivumab  infusion on 01/12  Patient was admitted for acute hypoxic respiratory failure with COVID-19 pneumonia on moderate pathway    Patient was given IV remdesivir and IV steroids and supplements and inflammatory markers were checked  Initial D-dimer was 0 5 and repeat was 0 3  Patient clinically improved and home O2 evaluation awaited  Vitals:    01/20/21 0742   BP: 124/57   Pulse: (!) 46   Resp: 16   Temp: 97 5 °F (36 4 °C)   SpO2: 97%       HEENT-PERRLA, moist oral mucosa  Neck-supple, no JVD elevation   Respiratory-equal air entry bilaterally, no rales or rhonchi  Cardiovascular system-S1, S2 heard, no murmur or gallops or rubs  Abdomen-soft, nontender, no guarding or rigidity, bowel sounds heard  Extremities-no pedal edema  Peripheral pulses palpable  Musculoskeletal-no contractures  Central nervous system-no acute focal neurological deficit ,no sensory or motor deficit noted  Skin-no rash noted          Significant Findings, Care, Treatment and Services Provided:  IV steroids, IV remdesivir, oxygen supplementation nasal cannula    Complications: nil    Condition at Discharge: good         Discharge instructions/Information to patient and family:   See after visit summary for information provided to patient and family  Provisions for Follow-Up Care:  See after visit summary for information related to follow-up care and any pertinent home health orders  PCP: Elvin Clark MD    Disposition: Home    Planned Readmission: No    Discharge Statement   I spent 45 minutes discharging the patient  This time was spent on the day of discharge  I had direct contact with the patient on the day of discharge  Additional documentation is required if more than 30 minutes were spent on discharge  Discharge Medications:  See after visit summary for reconciled discharge medications provided to patient and family

## 2021-01-20 NOTE — DISCHARGE INSTRUCTIONS
Atorvastatin (By mouth)   Atorvastatin (a-tor-va-STAT-in)  Lowers high cholesterol and triglyceride levels  Reduces the risk of angina, stroke, heart attack, or certain heart and blood vessel problems  This medicine is a statin  Brand Name(s): Lipitor   There may be other brand names for this medicine  When This Medicine Should Not Be Used: This medicine is not right for everyone  Do not use it if you had an allergic reaction to atorvastatin, if you have active liver disease, or if you are pregnant or breastfeeding  How to Use This Medicine:   Tablet  · Take your medicine as directed  Your dose may need to be changed several times to find what works best for you  · Take this medicine at the same time each day  · Swallow the tablet whole  Do not break, crush, or chew it  · Read and follow the patient instructions that come with this medicine  Talk to your doctor or pharmacist if you have any questions  · Missed dose: Take a dose as soon as you remember  If it is less than 12 hours until your next dose, skip the missed dose and take the next dose at the regular time  Do not take 2 doses of this medicine within 12 hours  · Store the medicine in a closed container at room temperature, away from heat, moisture, and direct light  Drugs and Foods to Avoid:   Ask your doctor or pharmacist before using any other medicine, including over-the-counter medicines, vitamins, and herbal products  · Some medicines can affect how atorvastatin works  Tell your doctor if you are using any of the following:  ? Boceprevir, cimetidine, clarithromycin, colchicine, cyclosporine, digoxin, erythromycin, gemfibrozil, glecaprevir/pibrentasvir, itraconazole, ketoconazole, niacin, rifampin, spironolactone  ? Birth control pills  ? Blood thinner (including warfarin)  ? Fibrate medicine  ?  Medicine to treat HIV/AIDS (including darunavir/ritonavir, efavirenz, elbasvir/grazoprevir, fosamprenavir, fosamprenavir/ritonavir, letermovir, lopinavir/ritonavir, nelfinavir, saquinavir/ritonavir, simeprevir, telaprevir, tipranavir/ritonavir)  · Do not eat grapefruit or drink grapefruit juice while you are using this medicine  Warnings While Using This Medicine:   · It is not safe to take this medicine during pregnancy  It could harm an unborn baby  Tell your doctor right away if you become pregnant  · Tell your doctor if you have kidney disease, diabetes, an infection, low blood pressure, muscle pain or weakness, seizures, thyroid problems, a recent stroke or TIA (transient ischemic attack), or a history of liver disease  Tell your doctor if you drink alcohol regularly  · This medicine may cause liver problems or myopathy or rhabdomyolysis (severe muscle damage), which can lead to kidney problems  · Tell any doctor or dentist who treats you that you are using this medicine  You may need to stop using this medicine several days before you have surgery or medical tests  · Your doctor will do lab tests at regular visits to check on the effects of this medicine  Keep all appointments  · Keep all medicine out of the reach of children  Never share your medicine with anyone    Possible Side Effects While Using This Medicine:   Call your doctor right away if you notice any of these side effects:  · Allergic reaction: Itching or hives, swelling in your face or hands, swelling or tingling in your mouth or throat, chest tightness, trouble breathing  · Blistering, peeling, red skin rash  · Change in how much or how often you urinate  · Dark urine or pale stools, nausea, vomiting, loss of appetite, stomach pain, yellow skin or eyes  · Fever  · Unexplained muscle pain, tenderness, or weakness  · Unusual tiredness  If you notice these less serious side effects, talk with your doctor:   · Arm, leg, or joint pain  · Diarrhea  · Stuffy or runny nose  If you notice other side effects that you think are caused by this medicine, tell your doctor  Call your doctor for medical advice about side effects  You may report side effects to FDA at 2-479-FDA-9797  © Copyright [facility] [address] Information is for End User's use only and may not be sold, redistributed or otherwise used for commercial purposes  The above information is an  only  It is not intended as medical advice for individual conditions or treatments  Talk to your doctor, nurse or pharmacist before following any medical regimen to see if it is safe and effective for you  Benzonatate (By mouth)   Benzonatate ([id number]-JJW-[id number]-king)  Treats cough  Brand Name(s): Kalli Yost   There may be other brand names for this medicine  When This Medicine Should Not Be Used: You should not use this medicine if you have had an allergic reaction to benzonatate in the past    How to Use This Medicine:   Capsule, Liquid Filled Capsule  · Your doctor will tell you how much medicine to take and how often  · Swallow the capsules whole; do not crush or chew  Chewing the capsule may numb your mouth and throat and you could choke  If a dose is missed:   · Take the missed dose as soon as possible  · If it is almost time for the next dose, wait until then to take your medicine and skip the missed dose  · You should not use two doses at the same time  How to Store and Dispose of This Medicine:   · Keep this medication in the original tightly closed, light-resistant container  · Store at room temperature, away from heat, moisture, and light  · Ask your pharmacist, doctor, or health caregiver about the best way to dispose of any outdated medicine or medicine no longer needed  · Keep all medicine away from children  Drugs and Foods to Avoid:   Ask your doctor or pharmacist before using any other medicine, including over-the-counter medicines, vitamins, and herbal products  · Avoid drinking alcohol while taking this medicine    Warnings While Using This Medicine:   · If you are pregnant or breastfeeding, talk to your doctor before taking this medicine  · Benzonatate is not recommended for children younger than 8years old  Possible Side Effects While Using This Medicine:   Call your doctor right away if you notice any of these side effects:  · Trouble breathing  · Tightness in the chest or throat  · Tremors, shakiness, seizures  · Skin rash, itching  If you notice these less serious side effects, talk with your doctor:   · Nausea, upset stomach  · Headache  · Mild dizziness  · Drowsiness  · Constipation  · Stuffy nose  If you notice other side effects that you think are caused by this medicine, tell your doctor  Call your doctor for medical advice about side effects  You may report side effects to FDA at 6-534-OCL-7743  © Copyright [address] Information is for End User's use only and may not be sold, redistributed or otherwise used for commercial purposes  The above information is an  only  It is not intended as medical advice for individual conditions or treatments  Talk to your doctor, nurse or pharmacist before following any medical regimen to see if it is safe and effective for you  Famotidine (By mouth)   Famotidine (evk-KN-ta-sinai)  Treats ulcers, gastroesophageal reflux disease (GERD), and conditions that cause the stomach to produce too much acid  Also treats heartburn caused by acid indigestion  Brand Name(s): Acid Controller, Acid Reducer, [facility] Acid Reducer, Good Sense Acid Reducer, Heartburn Relief, Leader Acid Reducer, Pepcid, Pepcid AC, Quality Choice Acid Controller, Sunmark Acid Reducer, TopCare Acid Reducer   There may be other brand names for this medicine  When This Medicine Should Not Be Used: This medicine is not right for everyone  Do not use it if you had an allergic reaction to famotidine or similar medicines    How to Use This Medicine:   Liquid, Tablet, Chewable Tablet, Dissolving Tablet  · Take your medicine as directed  Your dose may need to be changed several times to find what works best for you  · Follow the instructions on the medicine label if you are using this medicine without a prescription  · The chewable tablet must be chewed completely before you swallow it  · If you are using the disintegrating tablet, make sure your hands are dry before you handle it  Do not open the blister pack that contains the tablet until you are ready to take it  Remove the tablet from the blister pack by peeling back the foil, then taking the tablet out  Do not push the tablet through the foil  Place the tablet in your mouth  It should melt within 2 minutes  Swallow after the tablet has melted  · Shake the oral liquid for 5 to 10 seconds before each use  Measure the medicine with a marked measuring spoon or medicine cup  · Missed dose: Take a dose as soon as you remember  If it is almost time for your next dose, wait until then and take a regular dose  Do not take extra medicine to make up for a missed dose  · Store the medicine in a closed container at room temperature, away from heat, moisture, and direct light  Do not freeze the oral liquid  Throw away any unused oral liquid after 1 month  Drugs and Foods to Avoid:   Ask your doctor or pharmacist before using any other medicine, including over-the-counter medicines, vitamins, and herbal products  · Some medicines can affect how famotidine works  Tell your doctor if you are using cefditoren, dasatinib, delavirdine, fosamprenavir, or tizanidine  Warnings While Using This Medicine:   · Tell your doctor if you are pregnant or breastfeeding, or if you have kidney disease, liver disease, or stomach cancer  · This medicine may cause changes in your heart rhythm (including QT prolongation)  · Some brands or forms of this medicine may contain phenylalanine (aspartame)  If you have phenylketonuria (PKU), talk to your doctor before using this medicine    · Call your doctor if your symptoms do not improve or if they get worse  · Your doctor will do lab tests at regular visits to check on the effects of this medicine  Keep all appointments  · Keep all medicine out of the reach of children  Never share your medicine with anyone  Possible Side Effects While Using This Medicine:   Call your doctor right away if you notice any of these side effects:  · Allergic reaction: Itching or hives, swelling in your face or hands, swelling or tingling in your mouth or throat, chest tightness, trouble breathing  · Agitation, confusion, seizures  · Blistering, peeling, red skin rash  · Dark urine or pale stools, yellow eyes or skin  · Fainting, dizziness, lightheadedness  · Fast, pounding, or uneven heartbeat  · Fever, chills, cough, sore throat, body aches  · Unusual bleeding, bruising, or weakness  If you notice these less serious side effects, talk with your doctor:   · Constipation, diarrhea, upset stomach  · Headache  · Nausea, vomiting  If you notice other side effects that you think are caused by this medicine, tell your doctor  Call your doctor for medical advice about side effects  You may report side effects to FDA at 0-012-FDA-3024  © Copyright [address] [place] [address] Drive Information is for End User's use only and may not be sold, redistributed or otherwise used for commercial purposes  The above information is an  only  It is not intended as medical advice for individual conditions or treatments  Talk to your doctor, nurse or pharmacist before following any medical regimen to see if it is safe and effective for you  Prednisone (By mouth)   Prednisone (PRED-ni-sone)  Treats many diseases and conditions, especially problems related to inflammation  This medicine is a corticosteroid  Brand Name(s): Enrique, predniSONE Intensol   There may be other brand names for this medicine  When This Medicine Should Not Be Used: This medicine is not right for everyone   Do not use if you had an allergic reaction to prednisone or if you are pregnant  How to Use This Medicine:   Liquid, Tablet, Delayed Release Tablet  · Take your medicine as directed  Your dose may need to be changed several times to find what works best for you  · It is best to take this medicine with food or milk  · Swallow the delayed-release tablet whole  Do not crush, break, or chew it  · Measure the oral liquid medicine with a marked measuring spoon, oral syringe, or medicine cup  · Missed dose: Take a dose as soon as you remember  If it is almost time for your next dose, wait until then and take a regular dose  Do not take extra medicine to make up for a missed dose  · Store the medicine in a closed container at room temperature, away from heat, moisture, and direct light  Do not freeze the oral liquid  Drugs and Foods to Avoid:   Ask your doctor or pharmacist before using any other medicine, including over-the-counter medicines, vitamins, and herbal products  · Tell your doctor if you use any of the following:  ? Aminoglutethimide, amphotericin B, carbamazepine, cholestyramine, cyclosporine, digoxin, isoniazid, ketoconazole, phenobarbital, phenytoin, or rifampin  ? Blood thinner, such as warfarin  ? NSAID pain or arthritis medicine, such as aspirin, diclofenac, ibuprofen, naproxen, celecoxib  ? Diuretic (water pill)  ? Diabetes medicine  ? Macrolide antibiotic, such as azithromycin, clarithromycin, erythromycin  ? Estrogen, including birth control pills or hormone replacement therapy  · This medicine may interfere with vaccines  Ask your doctor before you get a flu shot or any other vaccines  Warnings While Using This Medicine:   · It is not safe to take this medicine during pregnancy  It could harm an unborn baby  Tell your doctor right away if you become pregnant    · Tell your doctor if you are breastfeeding or if you have kidney problems, heart failure, high blood pressure, a recent heart attack, diabetes, glaucoma, osteoporosis, or thyroid problems  Tell your doctor about any infection you have  Also tell your doctor if you have had mental or emotional problems (such as depression) or stomach or bowel problems (such as an ulcer or diverticulitis)  · This medicine may cause the following problems:  ? Mood or behavior changes  ? Higher blood pressure, retaining water, changes in salt or potassium levels in your body  ? Cataracts or glaucoma (with long-term use)  ? Weak bones or osteoporosis (with long-term use)  ? Slow growth in children (with long-term use)  ? Muscle problems (with high doses, especially if you have myasthenia gravis or similar nerve and muscle problems)  · Do not stop using this medicine suddenly  Your doctor will need to slowly decrease your dose before you stop it completely  · This medicine could cause you to get infections more easily  Tell your doctor right away if you are exposed to chicken pox, measles, or other serious infection  Tell your doctor if you had a serious infection in the past, such as tuberculosis or herpes  · Tell your doctor about any extra stress or anxiety in your life  Your dose might need to be changed for a short time  · Tell any doctor or dentist who treats you that you are using this medicine  This medicine may affect certain medical test results  · Keep all medicine out of the reach of children  Never share your medicine with anyone    Possible Side Effects While Using This Medicine:   Call your doctor right away if you notice any of these side effects:  · Allergic reaction: Itching or hives, swelling in your face or hands, swelling or tingling in your mouth or throat, chest tightness, trouble breathing  · Dark freckles, skin color changes, coldness, weakness, tiredness, nausea, vomiting, weight loss  · Depression, unusual thoughts, feelings, or behaviors, trouble sleeping  · Fever, chills, cough, sore throat, and body aches  · Muscle pain or weakness  · Rapid weight gain, swelling in your hands, ankles, or feet  · Severe stomach pain, nausea, vomiting, or red or black stools  · Skin changes or growths  · Trouble seeing, eye pain, headache  If you notice these less serious side effects, talk with your doctor:   · Increased appetite  · Round, puffy face  · Weight gain around your neck, upper back, breast, face, or waist  If you notice other side effects that you think are caused by this medicine, tell your doctor  Call your doctor for medical advice about side effects  You may report side effects to FDA at 1-193-IVC-2953  © Copyright [address] Information is for End User's use only and may not be sold, redistributed or otherwise used for commercial purposes  The above information is an  only  It is not intended as medical advice for individual conditions or treatments  Talk to your doctor, nurse or pharmacist before following any medical regimen to see if it is safe and effective for you  Zinc Acetate (By mouth)   Zinc Acetate (aidan AS-e-king)  Treats Héctor's disease (a disease in which too much copper builds up in the body)  Brand Name(s): Galzin   There may be other brand names for this medicine  When This Medicine Should Not Be Used: You should not use this medicine if you have had an allergic reaction to zinc    How to Use This Medicine:   Capsule  · Your doctor will tell you how much medicine to use and how often  · It is best to take this medicine on an empty stomach with plain water, at least 1 hour before or 2 hours after a meal  If the morning dose of this medicine upsets your stomach, try taking it between breakfast and lunch  · Swallow the capsules whole  Do not crush, open, or chew  If a dose is missed:   · Use the missed dose as soon as possible, unless it is almost time for your next dose  · Skip the missed dose if it is almost time for your next regular dose    · You should not use two doses at the same time  How to Store and Dispose of This Medicine:   · Store the capsules at room temperature in a closed container, away from heat, moisture, and direct light  · Keep all medicine out of the reach of children  Drugs and Foods to Avoid:   Ask your doctor or pharmacist before using any other medicine, including over-the-counter medicines, vitamins, and herbal products  · Your doctor may tell you to avoid eating foods that are high in copper  Some of these foods are liver, pork, tofu, shellfish, nuts, dried beans, mushrooms, broccoli, avocado, cocoa, and chocolate  Some instant breakfast drinks and cereals may contain copper  Read the package labels or ask your doctor if you have questions  Warnings While Using This Medicine:   · If you are pregnant or breastfeeding, talk to your doctor before using this medicine  Your doctor may tell you to stop breastfeeding while using this medicine, because you may give the medicine to your baby through breastmilk  · This medicine helps your body get rid of excess copper  It is important that you use this medicine on a regular schedule as your doctor ordered  Do not skip doses or stop using the medicine unless your doctor tells you to  Too much copper in your body can damage your liver, brain, or other organs  · You will need to have your blood and urine tested regularly to make sure the medicine is working  Keep all appointments your doctor makes for you  Possible Side Effects While Using This Medicine:   Call your doctor right away if you notice any of these side effects:  · Sharp or severe stomach pain  If you notice these less serious side effects, talk with your doctor:   · Upset stomach, mild heartburn  If you notice other side effects that you think are caused by this medicine, tell your doctor  Call your doctor for medical advice about side effects   You may report side effects to FDA at 0-780-FDA-0192  © Copyright [address] [place] [address] Information is for End User's use only and may not be sold, redistributed or otherwise used for commercial purposes  The above information is an  only  It is not intended as medical advice for individual conditions or treatments  Talk to your doctor, nurse or pharmacist before following any medical regimen to see if it is safe and effective for you

## 2021-01-20 NOTE — DISCHARGE INSTR - OTHER ORDERS
 If your pulse ox is < 90 - rest for 5 minutes and take deep breaths through your nose with the oxygen in place  Make sure your finger is warm (cold fingers will give falsely low readings)  After 5 minutes, recheck your pulse ox   If your pulse ox remains below 90:  o If excessively short of breath, call your PCP or pulmonologist AND consider further evaluation in the Emergency Department- mask must be worn to enter ED  o if not feeling excessively short of breath, place a call to your primary care physician or pulmonologist 24/7 (if after office hours the answering service  will contact the on call physician who will call you back)

## 2021-01-20 NOTE — RESPIRATORY THERAPY NOTE
Respiratory Care Note     01/20/21 0947   Inhalation Therapy Tx   SpO2 93 %   Resp Comments Dr Chad Dykes asked for Home Oxygen Qualifying Test to be repeated today  Pt's O2 sat=93% on room air  Pt  walked 170feet in her room due to Covid +  During exertion pt's O2 sat=87%  Pt  placed on 1 lpm/nc  While walking on 1 lpm/nc the pt  maintained an O2sat= 91-92%  Pt  needs 1 lpm/nc during exertion  RN and Doctor notified of results

## 2021-01-20 NOTE — ASSESSMENT & PLAN NOTE
Tested positive on 01/21-patient had symptoms cough and fever nausea and vomiting and for the past 3 days increasing shortness of breath and currently needing oxygen nasal cannula 3-4 L still    Patient received by bamlanivumab infusion this past Tuesday on 1/12/21  Continue COVID-19 on moderate pathway  Continue remdesivir and IV steroids and supplements-patient completed remdesivir  Was on Rocephin, doxycycline and these were stopped as procalcitonin was negative  Will keep close monitoring of oxygen saturation and O2 requirement  Home oxygen protocol was done and patient is saturating 94% on room air and 91% on exertion  Patient is currently on 2 L of oxygen saturating 94%  Will titrate oxygen down and monitor oxygen saturation  Clinically much improved  Will discharge on 3 more days prednisone and recheck for home oxygen evaluation and also will give script for Pepcid and statin and supplements

## 2021-01-20 NOTE — CASE MANAGEMENT
SW notified by Attending that pt has been medically cleared for discharge home today  Pt will need 1LNC home O2  SW called pt to discuss  SW confirmed pt's home address and phone number  Pt requests SW assistance in scheduling virtual visit with PCP Dr Rylan Petty  F/u scheduled for tomorrow 1/21 at 11:30  Info on AVS      Referral sent to Eloina Meyers  SW received approval to deliver portable tank from [facility] will call pt to schedule home delivery  Pt confirmed  will transport her home  Nursing and Attending advised of above

## 2021-01-20 NOTE — PLAN OF CARE
Problem: INFECTION - ADULT  Goal: Absence or prevention of progression during hospitalization  Description: INTERVENTIONS:  - Assess and monitor for signs and symptoms of infection  - Monitor lab/diagnostic results  - Monitor all insertion sites, i e  indwelling lines, tubes, and drains  - Monitor endotracheal if appropriate and nasal secretions for changes in amount and color  - [place] appropriate cooling/warming therapies per order  - Administer medications as ordered  - Instruct and encourage patient and family to use good hand hygiene technique  - Identify and instruct in appropriate isolation precautions for identified infection/condition  Outcome: Progressing     Problem: RESPIRATORY - ADULT  Goal: Achieves optimal ventilation and oxygenation  Description: INTERVENTIONS:  - Assess for changes in respiratory status  - Assess for changes in mentation and behavior  - Position to facilitate oxygenation and minimize respiratory effort  - Oxygen administered by appropriate delivery if ordered  - Initiate smoking cessation education as indicated  - Encourage broncho-pulmonary hygiene including cough, deep breathe, Incentive Spirometry  - Assess the need for suctioning and aspirate as needed  - Assess and instruct to report SOB or any respiratory difficulty  - Respiratory Therapy support as indicated  Outcome: Progressing     Problem: METABOLIC, FLUID AND ELECTROLYTES - ADULT  Goal: Electrolytes maintained within normal limits  Description: INTERVENTIONS:  - Monitor labs and assess patient for signs and symptoms of electrolyte imbalances  - Administer electrolyte replacement as ordered  - Monitor response to electrolyte replacements, including repeat lab results as appropriate  - Instruct patient on fluid and nutrition as appropriate  Outcome: Progressing  Goal: Fluid balance maintained  Description: INTERVENTIONS:  - Monitor labs   - Monitor I/O and WT  - Instruct patient on fluid and nutrition as appropriate  - Assess for signs & symptoms of volume excess or deficit  Outcome: Progressing     Problem: Potential for Falls  Goal: Patient will remain free of falls  Description: INTERVENTIONS:  - Assess patient frequently for physical needs  -  Identify cognitive and physical deficits and behaviors that affect risk of falls    -  [place] fall precautions as indicated by assessment   - Educate patient/family on patient safety including physical limitations  - Instruct patient to call for assistance with activity based on assessment  - Modify environment to reduce risk of injury  - Consider OT/PT consult to assist with strengthening/mobility  Outcome: Progressing

## 2021-01-21 ENCOUNTER — TELEMEDICINE (OUTPATIENT)
Dept: FAMILY MEDICINE CLINIC | Facility: CLINIC | Age: 51
End: 2021-01-21
Payer: COMMERCIAL

## 2021-01-21 VITALS — WEIGHT: 245 LBS | BODY MASS INDEX: 39.37 KG/M2 | HEIGHT: 66 IN

## 2021-01-21 DIAGNOSIS — J12.82 PNEUMONIA DUE TO COVID-19 VIRUS: Primary | ICD-10-CM

## 2021-01-21 DIAGNOSIS — U07.1 PNEUMONIA DUE TO COVID-19 VIRUS: Primary | ICD-10-CM

## 2021-01-21 PROCEDURE — 99496 TRANSJ CARE MGMT HIGH F2F 7D: CPT | Performed by: FAMILY MEDICINE

## 2021-01-21 PROCEDURE — 1111F DSCHRG MED/CURRENT MED MERGE: CPT | Performed by: FAMILY MEDICINE

## 2021-01-21 NOTE — PROGRESS NOTES
Assessment/Plan:        Problem List Items Addressed This Visit        Respiratory    Pneumonia due to COVID-19 virus - Primary    Relevant Orders    XR chest pa & lateral        she only use oxygen an home after taking shower otherwise she is feeling fine no breathing difficulty, she is still on last few days of oral prednisone, she will finish that, and advised to stay home and she should be re-evaluated next week for clearing her to go to work,  Her hospital record is reviewed and chest x-ray reviewed, I ordered the follow-up chest x-ray after month  Reason for visit is T cm  COVID pneumonia    Encounter provider Arlin Taveras MD       Provider located at [address] [place] [phone]      Recent Visits  No visits were found meeting these conditions  Showing recent visits within past 7 days and meeting all other requirements     Today's Visits  Date Type Provider Dept   01/21/21 Telemedicine Arlin Taveras MD [facility]   Showing today's visits and meeting all other requirements     Future Appointments  No visits were found meeting these conditions  Showing future appointments within next 150 days and meeting all other requirements        After connecting through [employer], the patient was identified by name and date of birth  Leigh Ann Havrey was informed that this is a telemedicine visit and that the visit is being conducted through [employer] and patient was informed that this is a secure, HIPAA-compliant platform  She agrees to proceed     My office door was closed  No one else was in the room  She acknowledged consent and understanding of privacy and security of the video platform  The patient has agreed to participate and understands they can discontinue the visit at any time  Patient is aware this is a billable service    TCM Call (since 12/21/2020)     Date and time call was made  1/20/2021 10:22 AM    Hospital care reviewed  Records reviewed    Patient was hospitialized at  [address]         Date of Admission  01/14/21    Date of discharge  01/20/21    Diagnosis  COVID-19 virus infection    Disposition  Home    Were the patients medications reviewed and updated  Yes    Current Symptoms  None      TCM Call (since 12/21/2020)     Post hospital issues  None    Should patient be enrolled in anticoag monitoring? No    Scheduled for follow up? Yes    Did you obtain your prescribed medications  Yes    Do you need help managing your prescriptions or medications  No    Is transportation to your appointment needed  No    I have advised the patient to call PCP with any new or worsening symptoms  JAYLAN COOPER MA    Support System  Family    Do you have social support  Yes, as much as I need    Are you recieving any outpatient services  No    Are you recieving home care services  No    Are you using any community resources  No    Current waiver services  No    Have you fallen in the last 12 months  No    Interperter language line needed  No            Subjective:     Patient ID: Cathleen Ramires is a 48 y o  female  She was admitted in the hospital from January 14 to January 20th, she came back home yesterday, with 1 L oxygen to be use as needed, she says she got out of breath after taking shower otherwise she is feeling fine, she was diagnosed COVID pneumonia, she was given IV remdisvir, and IV steroids and discharged on oral steroid for few more days, and Lipitor and supplements  In the hospital she was on nasal cannula oxygen,  She was given initially doxy and Rocephin which was stopped afterwards  She has no fever, chills, she denies any cough, overall she feels okay      Review of Systems   Constitutional: Negative for chills and fever  HENT: Negative for ear pain and sore throat  Eyes: Negative for pain and visual disturbance  Respiratory: Negative for cough and shortness of breath      Cardiovascular: Negative for chest pain and palpitations  Gastrointestinal: Negative for abdominal pain and vomiting  Genitourinary: Negative for dysuria and hematuria  Musculoskeletal: Negative for arthralgias and back pain  Skin: Negative for color change and rash  Neurological: Negative for seizures and syncope  All other systems reviewed and are negative  Objective:    Vitals:    01/21/21 1010   Weight: 111 kg (245 lb)   Height: 5' 6" (1 676 m)       Physical Exam  Constitutional:       Appearance: She is not ill-appearing  HENT:      Nose: No rhinorrhea  Pulmonary:      Effort: Pulmonary effort is normal    Neurological:      General: No focal deficit present  Mental Status: She is alert  Psychiatric:         Mood and Affect: Mood normal              Transitional Care Management Review:  Imer Benson is a 48 y o  female here for TCM follow up  During the TCM phone call patient stated:    TCM Call (since 12/21/2020)     Date and time call was made  1/20/2021 10:22 AM    Hospital care reviewed  Records reviewed    Patient was hospitialized at  [facility] [address]         Date of Admission  01/14/21    Date of discharge  01/20/21    Diagnosis  COVID-19 virus infection    Disposition  Home    Were the patients medications reviewed and updated  Yes    Current Symptoms  None      TCM Call (since 12/21/2020)     Post hospital issues  None    Should patient be enrolled in anticoag monitoring? No    Scheduled for follow up?   Yes    Did you obtain your prescribed medications  Yes    Do you need help managing your prescriptions or medications  No    Is transportation to your appointment needed  No    I have advised the patient to call PCP with any new or worsening symptoms  JAYLAN COOPER MA    Support System  Family    Do you have social support  Yes, as much as I need    Are you recieving any outpatient services  No    Are you recieving home care services  No    Are you using any community resources  No    Current waiver services No    Have you fallen in the last 12 months  No    Interperter language line needed  No          I spent 20  minutes with the patient during this visit      Kamila Ray MD

## 2021-01-22 NOTE — UTILIZATION REVIEW
Notification of Discharge  This is a Notification of Discharge from our facility [address]  Please be advised that this patient has been discharge from our facility  Below you will find the admission and discharge date and time including the patients disposition  PRESENTATION DATE: 1/14/2021 10:16 AM  OBS ADMISSION DATE:   IP ADMISSION DATE: 1/14/21 1145   DISCHARGE DATE: 1/20/2021  2:37 PM  DISPOSITION: Home/Self Care Home/Self Care   Admission Orders listed below:  Admission Orders (From admission, onward)     Ordered        01/14/21 1145  Inpatient Admission  Once                   Please contact the UR Department if additional information is required to close this patient's authorization/case  [address] [employer] Utilization Review Department  Main: [phone] x carefully listen to the prompts  All voicemails are confidential   [email or web address]  Send all requests for admission clinical reviews, approved or denied determinations and any other requests to dedicated fax number below belonging to the campus where the patient is receiving treatment   List of dedicated fax numbers:  [address] DENIALS (Administrative/Medical Necessity) [phone]   [address] (Maternity/NICU/Pediatrics) [phone]   [address] [phone]   Beatriz Murray [phone]   Jorge Alberto Aguiar [phone]   Yasmeen Camp [address] [phone]   [facility]  [phone]   [address] [facility]  [address] [phone]

## 2021-01-28 ENCOUNTER — TELEMEDICINE (OUTPATIENT)
Dept: FAMILY MEDICINE CLINIC | Facility: CLINIC | Age: 51
End: 2021-01-28
Payer: COMMERCIAL

## 2021-01-28 VITALS — BODY MASS INDEX: 39.37 KG/M2 | HEIGHT: 66 IN | WEIGHT: 245 LBS

## 2021-01-28 DIAGNOSIS — U07.1 PNEUMONIA DUE TO COVID-19 VIRUS: Primary | ICD-10-CM

## 2021-01-28 DIAGNOSIS — J12.82 PNEUMONIA DUE TO COVID-19 VIRUS: Primary | ICD-10-CM

## 2021-01-28 PROCEDURE — 1036F TOBACCO NON-USER: CPT | Performed by: FAMILY MEDICINE

## 2021-01-28 PROCEDURE — 3008F BODY MASS INDEX DOCD: CPT | Performed by: FAMILY MEDICINE

## 2021-01-28 PROCEDURE — 99213 OFFICE O/P EST LOW 20 MIN: CPT | Performed by: FAMILY MEDICINE

## 2021-01-28 PROCEDURE — 1111F DSCHRG MED/CURRENT MED MERGE: CPT | Performed by: FAMILY MEDICINE

## 2021-01-28 NOTE — PROGRESS NOTES
COVID-19 Virtual Visit     Assessment/Plan:    Problem List Items Addressed This Visit        Respiratory    Pneumonia due to COVID-19 virus - Primary       she is now is symptomatic  Disposition:     She feels back to normal, her symptoms have resolved and she is cleared to go back to work and she will get a follow-up ches x-ray for pneumonia around mid February    I have spent 15 minutes directly with the patient  Greater than 50% of this time was spent in counseling/coordination of care regarding: prognosis, importance of treatment compliance, risk factor reductions and impressions  Encounter provider Kamila Ray MD    Provider located at [address] [place] [phone]    Recent Visits  Date Type Provider Dept   01/21/21 Ho Pantoja MD [facility]   Showing recent visits within past 7 days and meeting all other requirements     Today's Visits  Date Type Provider Dept   01/28/21 Telemedicine Kamila Ray MD [facility]   Showing today's visits and meeting all other requirements     Future Appointments  No visits were found meeting these conditions  Showing future appointments within next 150 days and meeting all other requirements      This virtual check-in was done via [employer] and patient was informed that this is a secure, HIPAA-compliant platform  She agrees to proceed  Patient agrees to participate in a virtual check in via telephone or video visit instead of presenting to the office to address urgent/immediate medical needs  Patient is aware this is a billable service  After connecting through [facility], the patient was identified by name and date of birth  Lance Perkins was informed that this was a telemedicine visit and that the exam was being conducted confidentially over secure lines  My office door was closed  No one else was in the room   Lance Perkins acknowledged consent and understanding of privacy and security of the telemedicine visit  I informed the patient that I have reviewed her record in Epic and presented the opportunity for her to ask any questions regarding the visit today  The patient agreed to participate  Subjective:   Sharla Brunner is a 48 y o  female who has been screened for COVID-19  Patient is currently asymptomatic  Patient denies fever, chills, fatigue, malaise, congestion, rhinorrhea, sore throat, anosmia, loss of taste, cough, shortness of breath, chest tightness, abdominal pain, nausea, vomiting, diarrhea, myalgias and headaches  Date of symptom onset: 1/5/2021  Date of exposure: 1/3/2021  Date of positive COVID-19 PCR: 1/7/2021    She was hospitalized with the COVID pneumonia and since her last visit she has been feeling much better and she said now she feels back to normal she has no difficulty breathing she has no fever or chills, she is still taking Lipitor which was given for 30 days and taking vitamin-D, she does not need oxygen at all, she is doing her routine activity at home, and she will now go back to work her follow-up x-ray is due around mid February which was placed on her last visit    Lab Results   Component Value Date    SARSCOV2 Detected (A) 01/07/2021     Past Medical History:   Diagnosis Date    Diabetes mellitus ([facility] [address] )     Disease of thyroid gland      History reviewed  No pertinent surgical history    Current Outpatient Medications   Medication Sig Dispense Refill    atorvastatin (LIPITOR) 40 mg tablet Take 1 tablet (40 mg total) by mouth daily at bedtime 30 tablet 0    benzonatate (TESSALON PERLES) 100 mg capsule Take 1 capsule (100 mg total) by mouth 3 (three) times a day as needed for cough 20 capsule 0    cholecalciferol (VITAMIN D3) 1,000 units tablet Take 2 tablets (2,000 Units total) by mouth daily 30 each 0    famotidine (PEPCID) 20 mg tablet Take 1 tablet (20 mg total) by mouth 2 (two) times a day 30 tablet 0    metFORMIN (GLUCOPHAGE-XR) 500 mg 24 hr tablet       Synthroid 50 MCG tablet       ascorbic acid (VITAMIN C) 1000 MG tablet Take 1 tablet (1,000 mg total) by mouth every 12 (twelve) hours for 2 doses 2 tablet 0    zinc sulfate (ZINCATE) 220 mg capsule Take 1 capsule (220 mg total) by mouth daily for 1 dose 1 capsule 0     No current facility-administered medications for this visit  No Known Allergies    Review of Systems   Constitutional: Negative for chills, fatigue and fever  HENT: Negative for congestion, rhinorrhea and sore throat  Eyes: Negative  Respiratory: Negative for cough, chest tightness and shortness of breath  Gastrointestinal: Negative for abdominal pain, diarrhea, nausea and vomiting  Musculoskeletal: Negative for myalgias  Neurological: Negative for headaches  Objective:    Vitals:    01/28/21 0909   Weight: 111 kg (245 lb)   Height: 5' 6" (1 676 m)       Physical Exam  HENT:      Head: Atraumatic  Nose: No congestion  Pulmonary:      Effort: Pulmonary effort is normal  No respiratory distress  Neurological:      Mental Status: She is alert and oriented to person, place, and time  Psychiatric:         Mood and Affect: Mood normal        VIRTUAL VISIT DISCLAIMER    Emeigh Josemanuel acknowledges that she has consented to an online visit or consultation  She understands that the online visit is based solely on information provided by her, and that, in the absence of a face-to-face physical evaluation by the physician, the diagnosis she receives is both limited and provisional in terms of accuracy and completeness  This is not intended to replace a full medical face-to-face evaluation by the physician  Vic Abernathy understands and accepts these terms

## 2021-03-19 DIAGNOSIS — E03.9 ACQUIRED HYPOTHYROIDISM: Primary | ICD-10-CM

## 2021-03-19 RX ORDER — LEVOTHYROXINE SODIUM 0.05 MG/1
TABLET ORAL
Qty: 90 TABLET | Refills: 3 | Status: SHIPPED | OUTPATIENT
Start: 2021-03-19 | End: 2022-02-28

## 2021-03-19 RX ORDER — METFORMIN HYDROCHLORIDE 500 MG/1
TABLET, EXTENDED RELEASE ORAL
Qty: 90 TABLET | Refills: 3 | OUTPATIENT
Start: 2021-03-19

## 2021-03-19 NOTE — TELEPHONE ENCOUNTER
Pt is in the process of moving but as of right now wants to keep all her doctors here! She will be here 5/24 for a dentist appointment and wanted to be elsy here that day as well  She is elsy for 5/24/2021   She states she has enough meds until then  PLEASE REFUSE MED

## 2021-05-24 ENCOUNTER — OFFICE VISIT (OUTPATIENT)
Dept: FAMILY MEDICINE CLINIC | Facility: CLINIC | Age: 51
End: 2021-05-24
Payer: COMMERCIAL

## 2021-05-24 VITALS
HEIGHT: 66 IN | HEART RATE: 72 BPM | WEIGHT: 249 LBS | RESPIRATION RATE: 16 BRPM | BODY MASS INDEX: 40.02 KG/M2 | OXYGEN SATURATION: 98 % | DIASTOLIC BLOOD PRESSURE: 84 MMHG | SYSTOLIC BLOOD PRESSURE: 124 MMHG

## 2021-05-24 DIAGNOSIS — N95.1 PERIMENOPAUSAL: ICD-10-CM

## 2021-05-24 DIAGNOSIS — E66.01 CLASS 3 SEVERE OBESITY DUE TO EXCESS CALORIES WITH SERIOUS COMORBIDITY AND BODY MASS INDEX (BMI) OF 40.0 TO 44.9 IN ADULT (HCC): ICD-10-CM

## 2021-05-24 DIAGNOSIS — E53.8 B12 DEFICIENCY: ICD-10-CM

## 2021-05-24 DIAGNOSIS — E55.9 VITAMIN D DEFICIENCY: ICD-10-CM

## 2021-05-24 DIAGNOSIS — Z00.00 WELL ADULT EXAM: Primary | ICD-10-CM

## 2021-05-24 DIAGNOSIS — Z12.31 ENCOUNTER FOR SCREENING MAMMOGRAM FOR BREAST CANCER: ICD-10-CM

## 2021-05-24 DIAGNOSIS — Z12.11 SCREENING FOR MALIGNANT NEOPLASM OF COLON: ICD-10-CM

## 2021-05-24 DIAGNOSIS — E03.9 ACQUIRED HYPOTHYROIDISM: ICD-10-CM

## 2021-05-24 DIAGNOSIS — R73.03 PREDIABETES: ICD-10-CM

## 2021-05-24 PROBLEM — U07.1 PNEUMONIA DUE TO COVID-19 VIRUS: Status: RESOLVED | Noted: 2021-01-21 | Resolved: 2021-05-24

## 2021-05-24 PROBLEM — J12.82 PNEUMONIA DUE TO COVID-19 VIRUS: Status: RESOLVED | Noted: 2021-01-21 | Resolved: 2021-05-24

## 2021-05-24 PROBLEM — J96.91 RESPIRATORY FAILURE WITH HYPOXIA (HCC): Status: RESOLVED | Noted: 2021-01-14 | Resolved: 2021-05-24

## 2021-05-24 PROBLEM — B34.9 VIRAL INFECTION, UNSPECIFIED: Status: RESOLVED | Noted: 2021-01-07 | Resolved: 2021-05-24

## 2021-05-24 PROBLEM — U07.1 COVID-19 VIRUS INFECTION: Status: RESOLVED | Noted: 2021-01-11 | Resolved: 2021-05-24

## 2021-05-24 PROBLEM — Z03.818 ENCOUNTER FOR OBSERVATION FOR SUSPECTED EXPOSURE TO OTHER BIOLOGICAL AGENTS RULED OUT: Status: RESOLVED | Noted: 2021-01-07 | Resolved: 2021-05-24

## 2021-05-24 PROCEDURE — 3008F BODY MASS INDEX DOCD: CPT | Performed by: FAMILY MEDICINE

## 2021-05-24 PROCEDURE — 1036F TOBACCO NON-USER: CPT | Performed by: FAMILY MEDICINE

## 2021-05-24 PROCEDURE — 99396 PREV VISIT EST AGE 40-64: CPT | Performed by: FAMILY MEDICINE

## 2021-05-24 NOTE — PROGRESS NOTES
Assessment/Plan:    1  Well adult exam    2  Screening for malignant neoplasm of colon  -     Cologuard; Future    3  Encounter for screening mammogram for breast cancer  -     Mammo screening bilateral w 3d & cad; Future; Expected date: 05/24/2021    4  Acquired hypothyroidism  Assessment & Plan:  -stable/controlled, continue same medication  Will evaluate again next visit   [facility] 50mcg      Orders:  -     TSH, 3rd generation with Free T4 reflex; Future; Expected date: 05/24/2021    5  Prediabetes  Assessment & Plan:  -stable/controlled, continue same medication  Will evaluate again next visit    [facility] 500mcg     Orders:  -     Lipid panel; Future; Expected date: 05/24/2021  -     Comprehensive metabolic panel; Future; Expected date: 05/24/2021  -     CBC; Future; Expected date: 05/24/2021  -     UA w Reflex to Microscopic w Reflex to Culture; Future; Expected date: 05/24/2021  -     Hemoglobin A1C; Future; Expected date: 05/24/2021  -     Microalbumin / creatinine urine ratio    6  Class 3 severe obesity due to excess calories with serious comorbidity and body mass index (BMI) of 40 0 to 44 9 in adult [facility])  -     Lipid panel; Future; Expected date: 05/24/2021  -     Comprehensive metabolic panel; Future; Expected date: 05/24/2021    7  Vitamin D deficiency  -     Vitamin D 25 hydroxy; Future; Expected date: 05/24/2021    8  B12 deficiency  -     Vitamin B12; Future; Expected date: 05/24/2021    9  Perimenopausal  -     Follicle stimulating hormone; Future       Subjective:      Patient ID: Ian Godoy is a 48 y o  female      HPI  Will be transfering to [facility]   On metformin for predm   And synthroid for hypothyroidsm   Hx of coivid pna and no resudual going wel        The following portions of the patient's history were reviewed and updated as appropriate: allergies, current medications, past family history, past medical history, past social history, past surgical history and problem list     Review of Systems   Constitutional: Negative for fever and unexpected weight change  HENT: Negative for nosebleeds and trouble swallowing  Eyes: Negative for visual disturbance  Respiratory: Negative for chest tightness and shortness of breath  Cardiovascular: Negative for chest pain, palpitations and leg swelling  Gastrointestinal: Negative for abdominal pain, constipation, diarrhea and nausea  Endocrine: Negative for cold intolerance  Genitourinary: Negative for dysuria and urgency  Musculoskeletal: Negative for joint swelling and myalgias  Skin: Negative for rash  Neurological: Negative for tremors, seizures and syncope  Hematological: Does not bruise/bleed easily  Psychiatric/Behavioral: Negative for hallucinations and suicidal ideas  Objective:      /84 (BP Location: Left arm, Patient Position: Sitting, Cuff Size: Standard)   Pulse 72   Resp 16   Ht 5' 6" (1 676 m)   Wt 113 kg (249 lb)   SpO2 98%   BMI 40 19 kg/m²     No visits with results within 2 Week(s) from this visit     Latest known visit with results is:   Admission on 01/14/2021, Discharged on 01/20/2021   Component Date Value    Sodium 01/14/2021 138     Potassium 01/14/2021 3 3*    Chloride 01/14/2021 100     CO2 01/14/2021 29     ANION GAP 01/14/2021 9     BUN 01/14/2021 18     Creatinine 01/14/2021 0 71     Glucose 01/14/2021 121     Calcium 01/14/2021 8 8     AST 01/14/2021 39     ALT 01/14/2021 23     Alkaline Phosphatase 01/14/2021 44 7     Total Protein 01/14/2021 7 5     Albumin 01/14/2021 3 9     Total Bilirubin 01/14/2021 0 88     eGFR 01/14/2021 100     WBC 01/14/2021 4 42     RBC 01/14/2021 4 53     Hemoglobin 01/14/2021 13 6     Hematocrit 01/14/2021 39 9     MCV 01/14/2021 88     MCH 01/14/2021 30 0     MCHC 01/14/2021 34 1     RDW 01/14/2021 13 3     MPV 01/14/2021 11 3     Platelets 86/74/7635 203     Neutrophils Relative 01/14/2021 66     Immat GRANS % 01/14/2021 0     Lymphocytes Relative 01/14/2021 24     Monocytes Relative 01/14/2021 10     Eosinophils Relative 01/14/2021 0     Basophils Relative 01/14/2021 0     Neutrophils Absolute 01/14/2021 2 88     Immature Grans Absolute 01/14/2021 0 01     Lymphocytes Absolute 01/14/2021 1 07     Monocytes Absolute 01/14/2021 0 45     Eosinophils Absolute 01/14/2021 0 00     Basophils Absolute 01/14/2021 0 01     LACTIC ACID 01/14/2021 1 4     Magnesium 01/14/2021 2 2     CRP 01/14/2021 6 8*    Ferritin 01/14/2021 523*    Procalcitonin 01/14/2021 0 30*    D-Dimer, Quant  01/14/2021 0 50*    Troponin I 01/14/2021 <0 03     Procalcitonin 01/15/2021 0 13     POC Glucose 01/14/2021 175*    WBC 01/15/2021 3 40*    RBC 01/15/2021 4 21     Hemoglobin 01/15/2021 12 4     Hematocrit 01/15/2021 37 9     MCV 01/15/2021 90     MCH 01/15/2021 29 5     MCHC 01/15/2021 32 7     RDW 01/15/2021 13 5     MPV 01/15/2021 11 8     Platelets 62/47/8937 199     Sodium 01/15/2021 140     Potassium 01/15/2021 3 9     Chloride 01/15/2021 105     CO2 01/15/2021 26     ANION GAP 01/15/2021 9     BUN 01/15/2021 18     Creatinine 01/15/2021 0 64     Glucose 01/15/2021 139     Calcium 01/15/2021 8 6     AST 01/15/2021 24     ALT 01/15/2021 20     Alkaline Phosphatase 01/15/2021 42 9     Total Protein 01/15/2021 7 1     Albumin 01/15/2021 3 6     Total Bilirubin 01/15/2021 0 74     eGFR 01/15/2021 104     CRP 01/15/2021 4 4*    Ferritin 01/15/2021 407*    D-Dimer, Quant  01/15/2021 0 36     NT-proBNP 01/15/2021 115     Total CK 01/15/2021 39 7     Interleukin 6, Serum 01/15/2021 <2 5     Hepatitis B Surface Ag 01/15/2021 Non-reactive     Hep A IgM 01/15/2021 Non-reactive     Hepatitis C Ab 01/15/2021 Non-reactive     Hep B C IgM 01/15/2021 Non-reactive     HIV-1/HIV-2 Ab 01/15/2021 Non-Reactive     QFT Nil 01/15/2021 0 03     QFT TB1-NIL 01/15/2021 0 00     QFT TB2-NIL 01/15/2021 0 00     QFT Mitogen-NIL 01/15/2021 <0 10     QFT Final Interpretation 01/15/2021 Indeterminate*    TSH 3RD GENERATON 01/15/2021 0 751     ABO Grouping 01/15/2021 O     Rh Factor 01/15/2021 Positive     Antibody Screen 01/15/2021 Negative     Specimen Expiration Date 01/15/2021 [id number]     Segmented % 01/15/2021 76*    Bands % 01/15/2021 4     Lymphocytes % 01/15/2021 12*    Monocytes % 01/15/2021 8     Eosinophils, % 01/15/2021 0     Basophils % 01/15/2021 0     Absolute Neutrophils 01/15/2021 2 72     Lymphocytes Absolute 01/15/2021 0 41*    Monocytes Absolute 01/15/2021 0 27     Eosinophils Absolute 01/15/2021 0 00     Basophils Absolute 01/15/2021 0 00     Total Counted 01/15/2021 100     RBC Morphology 01/15/2021 Normal     Platelet Estimate 26/37/5243 Adequate     POC Glucose 01/15/2021 151*    POC Glucose 01/15/2021 122     Ventricular Rate 01/14/2021 60     Atrial Rate 01/14/2021 60     NJ Interval 01/14/2021 145     QRSD Interval 01/14/2021 96     QT Interval 01/14/2021 474     QTC Interval 01/14/2021 474     P Axis 01/14/2021 18     QRS Axis 01/14/2021 -3     T Wave Axis 01/14/2021 -62     POC Glucose 01/15/2021 126     POC Glucose 01/16/2021 145*    POC Glucose 01/16/2021 215*    POC Glucose 01/16/2021 102     POC Glucose 01/16/2021 106     POC Glucose 01/17/2021 146*    POC Glucose 01/17/2021 119     POC Glucose 01/17/2021 140     POC Glucose 01/17/2021 98     Sodium 01/18/2021 140     Potassium 01/18/2021 3 3*    Chloride 01/18/2021 102     CO2 01/18/2021 26     ANION GAP 01/18/2021 12     BUN 01/18/2021 16     Creatinine 01/18/2021 0 58     Glucose 01/18/2021 137     Calcium 01/18/2021 8 7     eGFR 01/18/2021 108     POC Glucose 01/18/2021 155*    POC Glucose 01/18/2021 129     POC Glucose 01/18/2021 132     POC Glucose 01/18/2021 117     POC Glucose 01/19/2021 150*    POC Glucose 01/19/2021 176*    POC Glucose 01/19/2021 100     POC Glucose 01/20/2021 129     POC Glucose 01/20/2021 117     Ventricular Rate 01/17/2021 46     Atrial Rate 01/17/2021 46     UT Interval 01/17/2021 154     QRSD Interval 01/17/2021 94     QT Interval 01/17/2021 559     QTC Interval 01/17/2021 490     P Axis 01/17/2021 33     QRS Axis 01/17/2021 -13     T Wave Axis 01/17/2021 -27           Physical Exam  Vitals signs and nursing note reviewed  Constitutional:       Appearance: She is well-developed  She is obese  HENT:      Head: Normocephalic and atraumatic  Neck:      Musculoskeletal: Normal range of motion and neck supple  Cardiovascular:      Rate and Rhythm: Normal rate and regular rhythm  Pulses: no weak pulses          Dorsalis pedis pulses are 2+ on the right side and 2+ on the left side  Heart sounds: Normal heart sounds  No murmur  Pulmonary:      Effort: Pulmonary effort is normal       Breath sounds: Normal breath sounds  No wheezing or rales  Abdominal:      General: Bowel sounds are normal  There is no distension  Palpations: Abdomen is soft  Tenderness: There is no abdominal tenderness  Musculoskeletal: Normal range of motion  General: No tenderness  Feet:      Right foot:      Skin integrity: Callus and dry skin present  No ulcer, skin breakdown, erythema or warmth  Left foot:      Skin integrity: Callus and dry skin present  No ulcer, skin breakdown, erythema or warmth  Lymphadenopathy:      Cervical: No cervical adenopathy  Skin:     General: Skin is warm and dry  Capillary Refill: Capillary refill takes less than 2 seconds  Findings: No rash  Neurological:      Mental Status: She is alert and oriented to person, place, and time  Cranial Nerves: No cranial nerve deficit  Sensory: No sensory deficit  Motor: No abnormal muscle tone  Psychiatric:         Behavior: Behavior normal          Thought Content:  Thought content normal          Judgment: Judgment normal          Patient's shoes and socks removed  Right Foot/Ankle   Right Foot Inspection  Skin Exam: skin normal, skin intact, dry skin, callus and callus no warmth, no erythema, no maceration, no abnormal color, no pre-ulcer and no ulcer                          Toe Exam: ROM and strength within normal limits  Sensory       Monofilament testing: intact  Vascular  Capillary refills: < 3 seconds  The right DP pulse is 2+  Left Foot/Ankle  Left Foot Inspection  Skin Exam: skin normal, skin intact, dry skin and callusno warmth, no erythema, no maceration, normal color, no pre-ulcer and no ulcer                         Toe Exam: ROM and strength within normal limits                   Sensory       Monofilament: intact  Vascular  Capillary refills: < 3 seconds  The left DP pulse is 2+  Assign Risk Category:  No deformity present; No loss of protective sensation; No weak pulses       Risk: 1      BMI Counseling: Body mass index is 40 19 kg/m²  The BMI is above normal  Nutrition recommendations include decreasing portion sizes, encouraging healthy choices of fruits and vegetables and limiting drinks that contain sugar  Exercise recommendations include moderate physical activity 150 minutes/week  No pharmacotherapy was ordered             Kamila Martinez MD  [place] [id number]

## 2021-05-28 ENCOUNTER — HOSPITAL ENCOUNTER (OUTPATIENT)
Dept: MAMMOGRAPHY | Facility: MEDICAL CENTER | Age: 51
Discharge: HOME | End: 2021-05-28
Payer: COMMERCIAL

## 2021-05-28 VITALS — BODY MASS INDEX: 40.02 KG/M2 | WEIGHT: 249 LBS | HEIGHT: 66 IN

## 2021-05-28 DIAGNOSIS — Z12.31 ENCOUNTER FOR SCREENING MAMMOGRAM FOR BREAST CANCER: ICD-10-CM

## 2021-05-28 LAB
25(OH)D3 SERPL-MCNC: 24 NG/ML (ref 30–100)
ALBUMIN SERPL-MCNC: 4 G/DL (ref 3.6–5.1)
ALBUMIN/GLOB SERPL: 1.5 (CALC) (ref 1–2.5)
ALP SERPL-CCNC: 65 U/L (ref 37–153)
ALT SERPL-CCNC: 25 U/L (ref 6–29)
APPEARANCE UR: ABNORMAL
AST SERPL-CCNC: 23 U/L (ref 10–35)
BACTERIA UR QL AUTO: ABNORMAL /HPF
BASOPHILS # BLD AUTO: 48 CELLS/UL (ref 0–200)
BASOPHILS NFR BLD AUTO: 0.7 %
BILIRUB SERPL-MCNC: 0.7 MG/DL (ref 0.2–1.2)
BILIRUB UR QL STRIP: NEGATIVE
BUN SERPL-MCNC: 9 MG/DL (ref 7–25)
BUN/CREAT SERPL: NORMAL (CALC) (ref 6–22)
CALCIUM SERPL-MCNC: 8.7 MG/DL (ref 8.6–10.4)
CHLORIDE SERPL-SCNC: 102 MMOL/L (ref 98–110)
CHOLEST SERPL-MCNC: 195 MG/DL
CHOLEST/HDLC SERPL: 4.9 (CALC)
CO2 SERPL-SCNC: 26 MMOL/L (ref 20–32)
COLOR UR: YELLOW
CREAT SERPL-MCNC: 0.5 MG/DL (ref 0.5–1.05)
EOSINOPHIL # BLD AUTO: 214 CELLS/UL (ref 15–500)
EOSINOPHIL NFR BLD AUTO: 3.1 %
ERYTHROCYTE [DISTWIDTH] IN BLOOD BY AUTOMATED COUNT: 12.9 % (ref 11–15)
GLOBULIN SER CALC-MCNC: 2.7 G/DL (CALC) (ref 1.9–3.7)
GLUCOSE SERPL-MCNC: 91 MG/DL (ref 65–99)
GLUCOSE UR QL STRIP: NEGATIVE
HBA1C MFR BLD: 5.5 % OF TOTAL HGB
HCT VFR BLD AUTO: 41.8 % (ref 35–45)
HDLC SERPL-MCNC: 40 MG/DL
HGB BLD-MCNC: 13.8 G/DL (ref 11.7–15.5)
HGB UR QL STRIP: NEGATIVE
HYALINE CASTS #/AREA URNS LPF: ABNORMAL /LPF
KETONES UR QL STRIP: NEGATIVE
LDLC SERPL CALC-MCNC: 118 MG/DL (CALC)
LEUKOCYTE ESTERASE UR QL STRIP: NEGATIVE
LYMPHOCYTES # BLD AUTO: 1939 CELLS/UL (ref 850–3900)
LYMPHOCYTES NFR BLD AUTO: 28.1 %
MCH RBC QN AUTO: 30.1 PG (ref 27–33)
MCHC RBC AUTO-ENTMCNC: 33 G/DL (ref 32–36)
MCV RBC AUTO: 91.1 FL (ref 80–100)
MONOCYTES # BLD AUTO: 552 CELLS/UL (ref 200–950)
MONOCYTES NFR BLD AUTO: 8 %
NEUTROPHILS # BLD AUTO: 4147 CELLS/UL (ref 1500–7800)
NEUTROPHILS NFR BLD AUTO: 60.1 %
NITRITE UR QL STRIP: NEGATIVE
NONHDLC SERPL-MCNC: 155 MG/DL (CALC)
PH UR STRIP: 5.5 [PH] (ref 5–8)
PLATELET # BLD AUTO: 285 THOUSAND/UL (ref 140–400)
PMV BLD REES-ECKER: 11.5 FL (ref 7.5–12.5)
POTASSIUM SERPL-SCNC: 4.5 MMOL/L (ref 3.5–5.3)
PROT SERPL-MCNC: 6.7 G/DL (ref 6.1–8.1)
PROT UR QL STRIP: NEGATIVE
RBC # BLD AUTO: 4.59 MILLION/UL (ref 3.8–5.1)
RBC #/AREA URNS HPF: ABNORMAL /HPF
SL AMB EGFR AFRICAN AMERICAN: 131 ML/MIN/1.73M2
SL AMB EGFR NON AFRICAN AMERICAN: 113 ML/MIN/1.73M2
SODIUM SERPL-SCNC: 138 MMOL/L (ref 135–146)
SP GR UR STRIP: 1.03 (ref 1–1.03)
SQUAMOUS #/AREA URNS HPF: ABNORMAL /HPF
TRIGL SERPL-MCNC: 243 MG/DL
TSH SERPL-ACNC: 3.76 MIU/L
VIT B12 SERPL-MCNC: 565 PG/ML (ref 200–1100)
WBC # BLD AUTO: 6.9 THOUSAND/UL (ref 3.8–10.8)
WBC #/AREA URNS HPF: ABNORMAL /HPF

## 2021-05-28 PROCEDURE — 77067 SCR MAMMO BI INCL CAD: CPT

## 2021-05-28 PROCEDURE — 77063 BREAST TOMOSYNTHESIS BI: CPT

## 2021-07-23 ENCOUNTER — TELEPHONE (OUTPATIENT)
Dept: FAMILY MEDICINE CLINIC | Facility: CLINIC | Age: 51
End: 2021-07-23

## 2021-11-17 ENCOUNTER — OFFICE VISIT (OUTPATIENT)
Dept: FAMILY MEDICINE CLINIC | Facility: CLINIC | Age: 51
End: 2021-11-17
Payer: COMMERCIAL

## 2021-11-17 VITALS
WEIGHT: 252 LBS | SYSTOLIC BLOOD PRESSURE: 140 MMHG | OXYGEN SATURATION: 97 % | BODY MASS INDEX: 40.5 KG/M2 | HEART RATE: 60 BPM | DIASTOLIC BLOOD PRESSURE: 90 MMHG | HEIGHT: 66 IN | RESPIRATION RATE: 16 BRPM

## 2021-11-17 DIAGNOSIS — E66.01 CLASS 3 SEVERE OBESITY DUE TO EXCESS CALORIES WITH SERIOUS COMORBIDITY AND BODY MASS INDEX (BMI) OF 40.0 TO 44.9 IN ADULT (HCC): ICD-10-CM

## 2021-11-17 DIAGNOSIS — R73.03 PREDIABETES: ICD-10-CM

## 2021-11-17 DIAGNOSIS — E55.9 VITAMIN D DEFICIENCY: ICD-10-CM

## 2021-11-17 DIAGNOSIS — E03.9 ACQUIRED HYPOTHYROIDISM: Primary | ICD-10-CM

## 2021-11-17 PROCEDURE — 99214 OFFICE O/P EST MOD 30 MIN: CPT | Performed by: FAMILY MEDICINE

## 2021-11-17 PROCEDURE — 3008F BODY MASS INDEX DOCD: CPT | Performed by: FAMILY MEDICINE

## 2021-11-17 PROCEDURE — 1036F TOBACCO NON-USER: CPT | Performed by: FAMILY MEDICINE

## 2021-11-17 PROCEDURE — 3725F SCREEN DEPRESSION PERFORMED: CPT | Performed by: FAMILY MEDICINE

## 2021-11-17 RX ORDER — DULAGLUTIDE 0.75 MG/.5ML
0.75 INJECTION, SOLUTION SUBCUTANEOUS WEEKLY
Qty: 2 ML | Refills: 0 | Status: SHIPPED | OUTPATIENT
Start: 2021-11-17 | End: 2022-03-24 | Stop reason: SDUPTHER

## 2022-03-23 LAB
25(OH)D3 SERPL-MCNC: 23 NG/ML (ref 30–100)
EST. AVERAGE GLUCOSE BLD GHB EST-MCNC: 120 MG/DL
EST. AVERAGE GLUCOSE BLD GHB EST-SCNC: 6.6 MMOL/L
HBA1C MFR BLD: 5.8 % OF TOTAL HGB
TSH SERPL-ACNC: 3.41 MIU/L

## 2022-03-24 DIAGNOSIS — R73.03 PREDIABETES: ICD-10-CM

## 2022-03-24 DIAGNOSIS — E55.9 VITAMIN D DEFICIENCY: ICD-10-CM

## 2022-03-24 DIAGNOSIS — E03.9 ACQUIRED HYPOTHYROIDISM: ICD-10-CM

## 2022-03-24 DIAGNOSIS — E66.01 CLASS 3 SEVERE OBESITY DUE TO EXCESS CALORIES WITH SERIOUS COMORBIDITY AND BODY MASS INDEX (BMI) OF 40.0 TO 44.9 IN ADULT (HCC): ICD-10-CM

## 2022-03-24 RX ORDER — METFORMIN HYDROCHLORIDE 500 MG/1
500 TABLET, EXTENDED RELEASE ORAL
Qty: 90 TABLET | Refills: 1 | Status: SHIPPED | OUTPATIENT
Start: 2022-03-24 | End: 2022-08-10

## 2022-03-24 RX ORDER — IRON HEME POLYPEPTIDE/FOLIC AC 12-1MG
5000 TABLET ORAL DAILY
Qty: 90 CAPSULE | Refills: 0 | Status: SHIPPED | OUTPATIENT
Start: 2022-03-24 | End: 2022-06-22

## 2022-03-24 RX ORDER — LEVOTHYROXINE SODIUM 0.07 MG/1
75 TABLET ORAL
Qty: 90 TABLET | Refills: 3 | Status: SHIPPED | OUTPATIENT
Start: 2022-03-24 | End: 2022-06-28 | Stop reason: SDUPTHER

## 2022-03-24 RX ORDER — DULAGLUTIDE 0.75 MG/.5ML
0.75 INJECTION, SOLUTION SUBCUTANEOUS WEEKLY
Qty: 2 ML | Refills: 0 | Status: SHIPPED | OUTPATIENT
Start: 2022-03-24 | End: 2022-04-22

## 2022-04-22 DIAGNOSIS — E66.01 CLASS 3 SEVERE OBESITY DUE TO EXCESS CALORIES WITH SERIOUS COMORBIDITY AND BODY MASS INDEX (BMI) OF 40.0 TO 44.9 IN ADULT (HCC): ICD-10-CM

## 2022-04-22 DIAGNOSIS — R73.03 PREDIABETES: Primary | ICD-10-CM

## 2022-04-22 RX ORDER — DULAGLUTIDE 1.5 MG/.5ML
1.5 INJECTION, SOLUTION SUBCUTANEOUS WEEKLY
Qty: 2 ML | Refills: 0 | Status: SHIPPED | OUTPATIENT
Start: 2022-04-22 | End: 2022-05-20

## 2022-05-20 ENCOUNTER — OFFICE VISIT (OUTPATIENT)
Dept: FAMILY MEDICINE CLINIC | Facility: CLINIC | Age: 52
End: 2022-05-20
Payer: COMMERCIAL

## 2022-05-20 VITALS
HEART RATE: 62 BPM | DIASTOLIC BLOOD PRESSURE: 80 MMHG | HEIGHT: 66 IN | BODY MASS INDEX: 40.34 KG/M2 | RESPIRATION RATE: 16 BRPM | WEIGHT: 251 LBS | SYSTOLIC BLOOD PRESSURE: 134 MMHG | OXYGEN SATURATION: 98 %

## 2022-05-20 DIAGNOSIS — Z00.00 WELL ADULT EXAM: ICD-10-CM

## 2022-05-20 DIAGNOSIS — Z12.31 ENCOUNTER FOR SCREENING MAMMOGRAM FOR BREAST CANCER: ICD-10-CM

## 2022-05-20 DIAGNOSIS — E55.9 VITAMIN D DEFICIENCY: ICD-10-CM

## 2022-05-20 DIAGNOSIS — E66.01 CLASS 3 SEVERE OBESITY DUE TO EXCESS CALORIES WITH SERIOUS COMORBIDITY AND BODY MASS INDEX (BMI) OF 40.0 TO 44.9 IN ADULT (HCC): ICD-10-CM

## 2022-05-20 DIAGNOSIS — R73.03 PREDIABETES: Primary | ICD-10-CM

## 2022-05-20 DIAGNOSIS — J02.9 ACUTE PHARYNGITIS, UNSPECIFIED ETIOLOGY: ICD-10-CM

## 2022-05-20 DIAGNOSIS — E03.9 ACQUIRED HYPOTHYROIDISM: ICD-10-CM

## 2022-05-20 PROCEDURE — 3725F SCREEN DEPRESSION PERFORMED: CPT | Performed by: FAMILY MEDICINE

## 2022-05-20 PROCEDURE — 3008F BODY MASS INDEX DOCD: CPT | Performed by: FAMILY MEDICINE

## 2022-05-20 PROCEDURE — 1036F TOBACCO NON-USER: CPT | Performed by: FAMILY MEDICINE

## 2022-05-20 PROCEDURE — 99214 OFFICE O/P EST MOD 30 MIN: CPT | Performed by: FAMILY MEDICINE

## 2022-05-20 PROCEDURE — 99396 PREV VISIT EST AGE 40-64: CPT | Performed by: FAMILY MEDICINE

## 2022-05-20 RX ORDER — PREDNISOLONE SODIUM PHOSPHATE 15 MG/5ML
10 SOLUTION ORAL 2 TIMES DAILY
Qty: 100 ML | Refills: 0 | Status: SHIPPED | OUTPATIENT
Start: 2022-05-20 | End: 2022-05-25

## 2022-05-20 RX ORDER — PHENTERMINE HYDROCHLORIDE 37.5 MG/1
37.5 TABLET ORAL EVERY MORNING
Qty: 30 TABLET | Refills: 1 | Status: SHIPPED | OUTPATIENT
Start: 2022-05-20 | End: 2022-06-21 | Stop reason: SDUPTHER

## 2022-05-20 RX ORDER — DULAGLUTIDE 3 MG/.5ML
3 INJECTION, SOLUTION SUBCUTANEOUS WEEKLY
Qty: 2 ML | Refills: 0 | Status: SHIPPED | OUTPATIENT
Start: 2022-05-20 | End: 2022-06-21

## 2022-05-20 NOTE — PROGRESS NOTES
Assessment/Plan:    1  Prediabetes  Comments:  cont with met and truylicity   Orders:  -     Dulaglutide (Trulicity) 3 QC/2 4WC SOPN; Inject 0 5 mL (3 mg total) under the skin once a week  -     phentermine (ADIPEX-P) 37 5 MG tablet; Take 1 tablet (37 5 mg total) by mouth every morning    2  Class 3 severe obesity due to excess calories with serious comorbidity and body mass index (BMI) of 40 0 to 44 9 in adult [facility])  Comments:  add phentermine   Orders:  -     Dulaglutide (Trulicity) 3 MC/6 3CA SOPN; Inject 0 5 mL (3 mg total) under the skin once a week  -     phentermine (ADIPEX-P) 37 5 MG tablet; Take 1 tablet (37 5 mg total) by mouth every morning    3  Well adult exam    4  Encounter for screening mammogram for breast cancer  -     Mammo screening bilateral w 3d & cad; Future; Expected date: 05/20/2022    5  Acquired hypothyroidism  Comments:  75mcg for the synthroid test i 1 yessenia h    6  Vitamin D deficiency  Comments:  start with 10k daily otc     7  Acute pharyngitis, unspecified etiology  -     prednisoLONE (ORAPRED) 15 mg/5 mL oral solution; Take 10 mL (30 mg total) by mouth in the morning and 10 mL (30 mg total) before bedtime  Do all this for 5 days  Subjective:      Patient ID: Ken Rivera is a 46 y o  female  HPI     Here to go over chronic issues and labs / imaging studies if applicable  Obesity   Hypothyroidism on 75       The following portions of the patient's history were reviewed and updated as appropriate: allergies, current medications, past family history, past medical history, past social history, past surgical history and problem list     Review of Systems   Constitutional: Negative for fever and unexpected weight change  HENT: Negative for nosebleeds and trouble swallowing  Eyes: Negative for visual disturbance  Respiratory: Negative for chest tightness and shortness of breath  Cardiovascular: Negative for chest pain, palpitations and leg swelling     Gastrointestinal: Negative for abdominal pain, constipation, diarrhea and nausea  Endocrine: Negative for cold intolerance  Genitourinary: Negative for dysuria and urgency  Musculoskeletal: Negative for joint swelling and myalgias  Skin: Negative for rash  Neurological: Negative for tremors, seizures and syncope  Hematological: Does not bruise/bleed easily  Psychiatric/Behavioral: Negative for hallucinations and suicidal ideas  Objective:       /80 (BP Location: Left arm, Patient Position: Sitting, Cuff Size: Large)   Pulse 62   Resp 16   Ht 5' 6" (1 676 m)   Wt 114 kg (251 lb)   SpO2 98%   BMI 40 51 kg/m²     No visits with results within 2 Week(s) from this visit  Latest known visit with results is:   Orders Only on 03/22/2022   Component Date Value    TSH W/RFX TO FREE T4 03/22/2022 3 41     Vitamin D, 25-Hydroxy, S* 03/22/2022 23 (A)    Hemoglobin A1C 03/22/2022 5 8 (A)    Estimated Average Glucose 03/22/2022 120     Estimated Average Glucos* 03/22/2022 6 6           Physical Exam  Vitals and nursing note reviewed  Constitutional:       Appearance: She is well-developed  She is obese  HENT:      Head: Normocephalic and atraumatic  Cardiovascular:      Rate and Rhythm: Normal rate and regular rhythm  Heart sounds: Normal heart sounds  No murmur heard  Pulmonary:      Effort: Pulmonary effort is normal       Breath sounds: Normal breath sounds  No wheezing or rales  Abdominal:      General: Bowel sounds are normal  There is no distension  Palpations: Abdomen is soft  Tenderness: There is no abdominal tenderness  Musculoskeletal:         General: No tenderness  Normal range of motion  Cervical back: Normal range of motion and neck supple  Lymphadenopathy:      Cervical: No cervical adenopathy  Skin:     General: Skin is warm and dry  Capillary Refill: Capillary refill takes less than 2 seconds  Findings: No rash     Neurological:      Mental Status: She is alert and oriented to person, place, and time  Cranial Nerves: No cranial nerve deficit  Sensory: No sensory deficit  Motor: No abnormal muscle tone  Psychiatric:         Behavior: Behavior normal          Thought Content: Thought content normal          Judgment: Judgment normal            Depression Screening and Follow-up Plan: Patient was screened for depression during today's encounter   They screened negative with a PHQ-2 score of 0       Rosie Bernal MD  [place] [phone]

## 2022-06-05 DIAGNOSIS — R73.03 PREDIABETES: Primary | ICD-10-CM

## 2022-06-21 ENCOUNTER — TELEMEDICINE (OUTPATIENT)
Dept: FAMILY MEDICINE CLINIC | Facility: CLINIC | Age: 52
End: 2022-06-21
Payer: COMMERCIAL

## 2022-06-21 VITALS — WEIGHT: 234 LBS | BODY MASS INDEX: 37.61 KG/M2 | HEIGHT: 66 IN

## 2022-06-21 DIAGNOSIS — E03.9 ACQUIRED HYPOTHYROIDISM: ICD-10-CM

## 2022-06-21 DIAGNOSIS — E66.01 CLASS 3 SEVERE OBESITY DUE TO EXCESS CALORIES WITH SERIOUS COMORBIDITY AND BODY MASS INDEX (BMI) OF 40.0 TO 44.9 IN ADULT (HCC): ICD-10-CM

## 2022-06-21 DIAGNOSIS — R73.03 PREDIABETES: ICD-10-CM

## 2022-06-21 DIAGNOSIS — E55.9 VITAMIN D DEFICIENCY: ICD-10-CM

## 2022-06-21 DIAGNOSIS — E66.01 CLASS 3 SEVERE OBESITY DUE TO EXCESS CALORIES WITH SERIOUS COMORBIDITY AND BODY MASS INDEX (BMI) OF 40.0 TO 44.9 IN ADULT (HCC): Primary | ICD-10-CM

## 2022-06-21 PROCEDURE — 99214 OFFICE O/P EST MOD 30 MIN: CPT | Performed by: FAMILY MEDICINE

## 2022-06-21 PROCEDURE — 1036F TOBACCO NON-USER: CPT | Performed by: FAMILY MEDICINE

## 2022-06-21 PROCEDURE — 3008F BODY MASS INDEX DOCD: CPT | Performed by: FAMILY MEDICINE

## 2022-06-21 RX ORDER — PHENTERMINE HYDROCHLORIDE 37.5 MG/1
37.5 TABLET ORAL EVERY MORNING
Qty: 30 TABLET | Refills: 1 | Status: SHIPPED | OUTPATIENT
Start: 2022-06-21 | End: 2022-08-10 | Stop reason: SDUPTHER

## 2022-06-21 RX ORDER — DULAGLUTIDE 4.5 MG/.5ML
4.5 INJECTION, SOLUTION SUBCUTANEOUS WEEKLY
Qty: 6 ML | Refills: 1 | Status: SHIPPED | OUTPATIENT
Start: 2022-06-21

## 2022-06-21 NOTE — PROGRESS NOTES
Virtual Regular Visit    Verification of patient location:    Patient is located in the following state in which I hold an active license [place]      Assessment/Plan:goijng well  Just get the vit D and  tsh done     Wt loss is good ! Problem List Items Addressed This Visit        Endocrine    Acquired hypothyroidism    Relevant Orders    TSH, 3rd generation with Free T4 reflex       Other    Prediabetes    Relevant Medications    phentermine (ADIPEX-P) 37 5 MG tablet    Vitamin D deficiency    Relevant Orders    Vitamin D 25 hydroxy      Other Visit Diagnoses     Class 3 severe obesity due to excess calories with serious comorbidity and body mass index (BMI) of 40 0 to 44 9 in [facility])    -  Primary    Relevant Medications    Dulaglutide (Trulicity) 4 5 NV/8 1XU SOPN    phentermine (ADIPEX-P) 37 5 MG tablet    Class 3 severe obesity due to excess calories with serious comorbidity and body mass index (BMI) of 40 0 to 44 9 in [facility])        add phentermine     Relevant Medications    Dulaglutide (Trulicity) 4 5 FO/1 3JW SOPN    phentermine (ADIPEX-P) 37 5 MG tablet               Reason for visit is   Chief Complaint   Patient presents with    Follow-up     1 month follow up    Virtual Regular Visit        Encounter provider Que Black MD    Provider located at [address] [place] [id number]      Recent Visits  No visits were found meeting these conditions  Showing recent visits within past 7 days and meeting all other requirements  Today's Visits  Date Type Provider Dept   06/21/22 Telemedicine Que Black MD [facility]   Showing today's visits and meeting all other requirements  Future Appointments  No visits were found meeting these conditions  Showing future appointments within next 150 days and meeting all other requirements       The patient was identified by name and date of birth   Randa Angie was informed that this is a telemedicine visit and that the visit is being conducted through [address] [place] [address] Now and patient was informed that this is a secure, HIPAA-compliant platform  She agrees to proceed     My office door was closed  No one else was in the room  She acknowledged consent and understanding of privacy and security of the video platform  The patient has agreed to participate and understands they can discontinue the visit at any time  Patient is aware this is a billable service  Subjective  Blu Adorno is a 46 y o  female    HPI Here to go over chronic issues and labs / imaging studies if applicable  247 down to 234   255 highest     a1c 5 8   lft and t bili elevated     History reviewed  No pertinent past medical history  History reviewed  No pertinent surgical history  Current Outpatient Medications   Medication Sig Dispense Refill    Cholecalciferol (Dialyvite Vitamin D 5000) 125 MCG (5000 UT) capsule Take 1 capsule (5,000 Units total) by mouth daily 90 capsule 0    Dulaglutide (Trulicity) 4 5 DZ/8 6PV SOPN Inject 0 5 mL (4 5 mg total) under the skin once a week 6 mL 1    levothyroxine (Synthroid) 75 mcg tablet Take 1 tablet (75 mcg total) by mouth daily in the early morning 90 tablet 3    metFORMIN (GLUCOPHAGE-XR) 500 mg 24 hr tablet Take 1 tablet (500 mg total) by mouth daily with breakfast 90 tablet 1    phentermine (ADIPEX-P) 37 5 MG tablet Take 1 tablet (37 5 mg total) by mouth every morning 30 tablet 1     No current facility-administered medications for this visit  No Known Allergies    Review of Systems   Constitutional: Negative for fever and unexpected weight change  HENT: Negative for nosebleeds and trouble swallowing  Eyes: Negative for visual disturbance  Respiratory: Negative for chest tightness and shortness of breath  Cardiovascular: Negative for chest pain, palpitations and leg swelling  Gastrointestinal: Negative for abdominal pain, constipation, diarrhea and nausea  Endocrine: Negative for cold intolerance  Genitourinary: Negative for dysuria and urgency  Musculoskeletal: Negative for joint swelling and myalgias  Skin: Negative for rash  Neurological: Negative for tremors, seizures and syncope  Hematological: Does not bruise/bleed easily  Psychiatric/Behavioral: Negative for hallucinations and suicidal ideas  Video Exam    Vitals:    06/21/22 1559   Weight: 106 kg (234 lb)   Height: 5' 6" (1 676 m)       Physical Exam  Constitutional:       Appearance: She is well-developed  She is obese  Eyes:      Conjunctiva/sclera: Conjunctivae normal    Pulmonary:      Effort: Pulmonary effort is normal    Musculoskeletal:      Cervical back: Normal range of motion  Neurological:      Mental Status: She is alert and oriented to person, place, and time  Psychiatric:         Behavior: Behavior normal          Thought Content: Thought content normal          Judgment: Judgment normal           I spent 15 minutes directly with the patient during this visit    VIRTUAL VISIT DISCLAIMER      Suman Santos verbally agrees to participate in [employer]  Pt is aware that [employer] could be limited without vital signs or the ability to perform a full hands-on physical Kary Reas understands she or the provider may request at any time to terminate the video visit and request the patient to seek care or treatment in person

## 2022-06-28 DIAGNOSIS — E03.9 ACQUIRED HYPOTHYROIDISM: ICD-10-CM

## 2022-06-28 RX ORDER — LEVOTHYROXINE SODIUM 0.07 MG/1
75 TABLET ORAL
Qty: 90 TABLET | Refills: 0 | Status: SHIPPED | OUTPATIENT
Start: 2022-06-28 | End: 2022-08-10 | Stop reason: SDUPTHER

## 2022-08-10 ENCOUNTER — TELEMEDICINE (OUTPATIENT)
Dept: FAMILY MEDICINE CLINIC | Facility: CLINIC | Age: 52
End: 2022-08-10
Payer: COMMERCIAL

## 2022-08-10 VITALS — WEIGHT: 219 LBS | BODY MASS INDEX: 35.35 KG/M2

## 2022-08-10 DIAGNOSIS — R73.03 PREDIABETES: ICD-10-CM

## 2022-08-10 DIAGNOSIS — E03.9 ACQUIRED HYPOTHYROIDISM: ICD-10-CM

## 2022-08-10 DIAGNOSIS — E66.01 CLASS 3 SEVERE OBESITY DUE TO EXCESS CALORIES WITH SERIOUS COMORBIDITY AND BODY MASS INDEX (BMI) OF 40.0 TO 44.9 IN ADULT (HCC): ICD-10-CM

## 2022-08-10 PROCEDURE — 99213 OFFICE O/P EST LOW 20 MIN: CPT | Performed by: FAMILY MEDICINE

## 2022-08-10 RX ORDER — LEVOTHYROXINE SODIUM 0.07 MG/1
75 TABLET ORAL
Qty: 90 TABLET | Refills: 1 | Status: SHIPPED | OUTPATIENT
Start: 2022-08-10

## 2022-08-10 RX ORDER — PHENTERMINE HYDROCHLORIDE 37.5 MG/1
37.5 TABLET ORAL EVERY MORNING
Qty: 90 TABLET | Refills: 0 | Status: SHIPPED | OUTPATIENT
Start: 2022-08-10

## 2022-08-10 NOTE — PROGRESS NOTES
Virtual Regular Visit    Verification of patient location:    Patient is located in the following state in which I hold an active license [place]      Assessment/Plan:    No changes   Things are going well still   Work on muscle toning         Problem List Items Addressed This Visit        Endocrine    Acquired hypothyroidism    Relevant Medications    levothyroxine (Synthroid) 75 mcg tablet       Other    Prediabetes    Relevant Medications    phentermine (ADIPEX-P) 37 5 MG tablet      Other Visit Diagnoses     Class 3 severe obesity due to excess calories with serious comorbidity and body mass index (BMI) of 40 0 to 44 9 in adult [facility])        add phentermine     Relevant Medications    phentermine (ADIPEX-P) 37 5 MG tablet               Reason for visit is   Chief Complaint   Patient presents with    Follow-up    Virtual Regular Visit        Encounter provider Shari Preston MD    Provider located at [address] [place], [place] [id number]      Recent Visits  No visits were found meeting these conditions  Showing recent visits within past 7 days and meeting all other requirements  Today's Visits  Date Type Provider Dept   08/10/22 Telemedicine Shari Preston MD [facility]   Showing today's visits and meeting all other requirements  Future Appointments  No visits were found meeting these conditions  Showing future appointments within next 150 days and meeting all other requirements       The patient was identified by name and date of birth  France Alba was informed that this is a telemedicine visit and that the visit is being conducted through [address] [place] [address] Now and patient was informed that this is a secure, HIPAA-compliant platform  She agrees to proceed     My office door was closed  No one else was in the room  She acknowledged consent and understanding of privacy and security of the video platform   The patient has agreed to participate and understands they can discontinue the visit at any time  Patient is aware this is a billable service  Kb Lee is a 46 y o  female    HPI   Here to go over chronic issues and labs / imaging studies if applicable  255lbs top wt     No SE currently   Constipation some times     Mild nausea   Slight insomnia      No past medical history on file  No past surgical history on file  Current Outpatient Medications   Medication Sig Dispense Refill    Dulaglutide (Trulicity) 4 5 TZ/9 5VP SOPN Inject 0 5 mL (4 5 mg total) under the skin once a week 6 mL 1    levothyroxine (Synthroid) 75 mcg tablet Take 1 tablet (75 mcg total) by mouth daily in the early morning 90 tablet 1    phentermine (ADIPEX-P) 37 5 MG tablet Take 1 tablet (37 5 mg total) by mouth every morning 90 tablet 0    Cholecalciferol (Dialyvite Vitamin D 5000) 125 MCG (5000 UT) capsule Take 1 capsule (5,000 Units total) by mouth daily 90 capsule 0     No current facility-administered medications for this visit  No Known Allergies    Review of Systems   Constitutional: Negative for fever and unexpected weight change  HENT: Negative for nosebleeds and trouble swallowing  Eyes: Negative for visual disturbance  Respiratory: Negative for chest tightness and shortness of breath  Cardiovascular: Negative for chest pain, palpitations and leg swelling  Gastrointestinal: Negative for abdominal pain, constipation, diarrhea and nausea  Endocrine: Negative for cold intolerance  Genitourinary: Negative for dysuria and urgency  Musculoskeletal: Negative for joint swelling and myalgias  Skin: Negative for rash  Neurological: Negative for tremors, seizures and syncope  Hematological: Does not bruise/bleed easily  Psychiatric/Behavioral: Negative for hallucinations and suicidal ideas         Video Exam    Vitals:    08/10/22 1453   Weight: 99 3 kg (219 lb)       Physical Exam  Constitutional:       Appearance: She is well-developed  She is obese  Eyes:      Conjunctiva/sclera: Conjunctivae normal    Pulmonary:      Effort: Pulmonary effort is normal    Musculoskeletal:      Cervical back: Normal range of motion  Neurological:      Mental Status: She is alert and oriented to person, place, and time  Psychiatric:         Behavior: Behavior normal          Thought Content:  Thought content normal          Judgment: Judgment normal           I spent 15 minutes directly with the patient during this visit

## 2023-12-12 ENCOUNTER — TELEMEDICINE (OUTPATIENT)
Dept: FAMILY MEDICINE CLINIC | Facility: CLINIC | Age: 53
End: 2023-12-12
Payer: COMMERCIAL

## 2023-12-12 VITALS — WEIGHT: 240 LBS | BODY MASS INDEX: 38.57 KG/M2 | HEIGHT: 66 IN

## 2023-12-12 DIAGNOSIS — R73.03 PREDIABETES: ICD-10-CM

## 2023-12-12 DIAGNOSIS — Z12.31 ENCOUNTER FOR SCREENING MAMMOGRAM FOR BREAST CANCER: Primary | ICD-10-CM

## 2023-12-12 DIAGNOSIS — E55.9 VITAMIN D DEFICIENCY: ICD-10-CM

## 2023-12-12 DIAGNOSIS — E53.8 B12 DEFICIENCY: ICD-10-CM

## 2023-12-12 DIAGNOSIS — Z79.899 OTHER LONG TERM (CURRENT) DRUG THERAPY: ICD-10-CM

## 2023-12-12 DIAGNOSIS — E03.9 ACQUIRED HYPOTHYROIDISM: ICD-10-CM

## 2023-12-12 PROCEDURE — 99214 OFFICE O/P EST MOD 30 MIN: CPT | Performed by: FAMILY MEDICINE

## 2023-12-12 NOTE — PROGRESS NOTES
Virtual Regular Visit    Verification of patient location:    Patient is located at Home in the following state in which I hold an active license [place]      Assessment/Plan:    Problem List Items Addressed This Visit     Prediabetes    Acquired hypothyroidism    Relevant Orders    TSH, 3rd generation with Free T4 reflex    Vitamin D deficiency    Relevant Orders    Vitamin D 25 hydroxy   Other Visit Diagnoses     Encounter for screening mammogram for breast cancer    -  Primary    Relevant Orders    Mammo screening bilateral w 3d & cad    B12 deficiency        Relevant Orders    Vitamin B12    Other long term (current) drug therapy        Relevant Orders    Lipid panel    Comprehensive metabolic panel    CBC    UA w Reflex to Microscopic w Reflex to Culture    Hemoglobin A1C    BMI 38.0-38.9,adult            Get the labs done   Will more than likely need 75mcg synthroid   And will use phentermine again   And hopefully she is not in the diabetes range only the predm range   Get the mammo done also please   Cologard due 7/24    Depression Screening and Follow-up Plan: Patient was screened for depression during today's encounter. They screened negative with a PHQ-2 score of 0. Reason for visit is   Chief Complaint   Patient presents with   • Follow-up   • Virtual Regular Visit          Encounter provider Alex Cardona MD    Provider located at [address] [place] [id number]      Recent Visits  No visits were found meeting these conditions. Showing recent visits within past 7 days and meeting all other requirements  Today's Visits  Date Type Provider Dept   12/12/23 Telemedicine MD Charlie Kasper   Showing today's visits and meeting all other requirements  Future Appointments  No visits were found meeting these conditions.   Showing future appointments within next 150 days and meeting all other requirements       The patient was identified by name and date of birth. Abbey Pierre was informed that this is a telemedicine visit and that the visit is being conducted through the [employer] [facility] Now platform. She agrees to proceed. .  My office door was closed. No one else was in the room. She acknowledged consent and understanding of privacy and security of the video platform. The patient has agreed to participate and understands they can discontinue the visit at any time. Patient is aware this is a billable service. Subjective  Abbey Pierre is a 48 y.o. female  . HPI     Here for recheck   Hasn't been seen in 1.5 yrs   Was on weight loss meds for predm and thyroid disease  Has gained 20lbs since then     History reviewed. No pertinent past medical history. History reviewed. No pertinent surgical history. Current Outpatient Medications   Medication Sig Dispense Refill   • Cholecalciferol (Dialyvite Vitamin D 5000) 125 MCG (5000 UT) capsule Take 1 capsule (5,000 Units total) by mouth daily 90 capsule 0   • Dulaglutide (Trulicity) 4.5 XA/1.4WN SOPN Inject 0.5 mL (4.5 mg total) under the skin once a week 6 mL 1   • levothyroxine (Synthroid) 75 mcg tablet Take 1 tablet (75 mcg total) by mouth daily in the early morning 90 tablet 1   • phentermine (ADIPEX-P) 37.5 MG tablet Take 1 tablet (37.5 mg total) by mouth every morning 90 tablet 0     No current facility-administered medications for this visit. No Known Allergies    Review of Systems   Constitutional:  Negative for fever and unexpected weight change. HENT:  Negative for nosebleeds and trouble swallowing. Eyes:  Negative for visual disturbance. Respiratory:  Negative for chest tightness and shortness of breath. Cardiovascular:  Negative for chest pain, palpitations and leg swelling. Gastrointestinal:  Negative for abdominal pain, constipation, diarrhea and nausea. Endocrine: Negative for cold intolerance. Genitourinary:  Negative for dysuria and urgency.    Musculoskeletal: Negative for joint swelling and myalgias. Skin:  Negative for rash. Neurological:  Negative for tremors, seizures and syncope. Hematological:  Does not bruise/bleed easily. Psychiatric/Behavioral:  Negative for hallucinations and suicidal ideas. Video Exam    Vitals:    12/12/23 1249   Weight: 109 kg (240 lb)   Height: 5' 6" (1.676 m)       Physical Exam  Constitutional:       Appearance: She is well-developed. She is obese. Eyes:      Conjunctiva/sclera: Conjunctivae normal.   Pulmonary:      Effort: Pulmonary effort is normal.   Musculoskeletal:      Cervical back: Normal range of motion. Neurological:      Mental Status: She is alert and oriented to person, place, and time. Psychiatric:         Behavior: Behavior normal.         Thought Content:  Thought content normal.         Judgment: Judgment normal.          Visit Time  Total Visit Duration: 15

## 2023-12-29 DIAGNOSIS — E03.9 ACQUIRED HYPOTHYROIDISM: ICD-10-CM

## 2023-12-29 RX ORDER — LEVOTHYROXINE SODIUM 0.07 MG/1
75 TABLET ORAL
Qty: 90 TABLET | Refills: 1 | Status: SHIPPED | OUTPATIENT
Start: 2023-12-29 | End: 2023-12-29 | Stop reason: SDUPTHER

## 2023-12-29 RX ORDER — LEVOTHYROXINE SODIUM 0.07 MG/1
75 TABLET ORAL
Qty: 14 TABLET | Refills: 0 | Status: SHIPPED | OUTPATIENT
Start: 2023-12-29

## 2024-06-13 ENCOUNTER — OFFICE VISIT (OUTPATIENT)
Dept: FAMILY MEDICINE CLINIC | Facility: CLINIC | Age: 54
End: 2024-06-13
Payer: COMMERCIAL

## 2024-06-13 VITALS
SYSTOLIC BLOOD PRESSURE: 122 MMHG | BODY MASS INDEX: 39.86 KG/M2 | DIASTOLIC BLOOD PRESSURE: 78 MMHG | OXYGEN SATURATION: 98 % | WEIGHT: 248 LBS | HEIGHT: 66 IN | HEART RATE: 77 BPM

## 2024-06-13 DIAGNOSIS — Z00.00 WELL ADULT EXAM: Primary | ICD-10-CM

## 2024-06-13 DIAGNOSIS — E66.01 CLASS 3 SEVERE OBESITY DUE TO EXCESS CALORIES WITH SERIOUS COMORBIDITY AND BODY MASS INDEX (BMI) OF 40.0 TO 44.9 IN ADULT (HCC): ICD-10-CM

## 2024-06-13 DIAGNOSIS — E03.9 ACQUIRED HYPOTHYROIDISM: ICD-10-CM

## 2024-06-13 DIAGNOSIS — Z12.11 SCREENING FOR MALIGNANT NEOPLASM OF COLON: ICD-10-CM

## 2024-06-13 DIAGNOSIS — R73.03 PREDIABETES: ICD-10-CM

## 2024-06-13 DIAGNOSIS — E55.9 VITAMIN D DEFICIENCY: ICD-10-CM

## 2024-06-13 DIAGNOSIS — Z12.31 SCREENING MAMMOGRAM FOR BREAST CANCER: ICD-10-CM

## 2024-06-13 LAB — HBA1C MFR BLD HPLC: 6.1 %

## 2024-06-13 PROCEDURE — 99396 PREV VISIT EST AGE 40-64: CPT | Performed by: FAMILY MEDICINE

## 2024-06-13 PROCEDURE — 99214 OFFICE O/P EST MOD 30 MIN: CPT | Performed by: FAMILY MEDICINE

## 2024-06-13 RX ORDER — PHENTERMINE HYDROCHLORIDE 37.5 MG/1
37.5 TABLET ORAL EVERY MORNING
Qty: 90 TABLET | Refills: 0 | Status: SHIPPED | OUTPATIENT
Start: 2024-06-13

## 2024-06-13 NOTE — PROGRESS NOTES
Ambulatory Visit  Name: Georgette Bedoya      : 1970      MRN: [id number]  Encounter Provider: Alfonso Medina MD  Encounter Date: 2024   Encounter department: [facility]    Assessment & Plan  1. Weight management.  A comprehensive blood panel will be conducted to ascertain the appropriateness of her current dosage of 75 mg. Phentermine will be refilled, and a follow-up appointment will be scheduled in 2 months to verify the appropriatency of the current dosage. The patient was counseled on the importance of maintaining a healthy diet, incorporating ice cream as a reward, and incorporating regular walking into her routine. Should she not be a diabetic, a prescription for Wegovy will be issued. If she is not diabetic, she will revert to Trulicity.    Follow-up  The patient is scheduled for a virtual follow-up in 6 months.    Orders Placed This Encounter   Procedures    Cologuard    Mammo screening bilateral w 3d & cad      1. Well adult exam  2. Class 3 severe obesity due to excess calories with serious comorbidity and body mass index (BMI) of 40.0 to 44.9 in adult (HCC)  -     phentermine (ADIPEX-P) 37.5 MG tablet; Take 1 tablet (37.5 mg total) by mouth every morning  3. Acquired hypothyroidism  4. Prediabetes  -     phentermine (ADIPEX-P) 37.5 MG tablet; Take 1 tablet (37.5 mg total) by mouth every morning  5. Screening for malignant neoplasm of colon  -     Cologuard  6. Prediabetes  Comments:  cont with met and truylicity   Orders:  -     phentermine (ADIPEX-P) 37.5 MG tablet; Take 1 tablet (37.5 mg total) by mouth every morning  7. Class 3 severe obesity due to excess calories with serious comorbidity and body mass index (BMI) of 40.0 to 44.9 in adult (HCC)  Comments:  add phentermine   Orders:  -     phentermine (ADIPEX-P) 37.5 MG tablet; Take 1 tablet (37.5 mg total) by mouth every morning  8. Vitamin D deficiency  9. Screening mammogram for breast cancer  -     Mammo screening  "bilateral w 3d & cad; Future; Expected date: 06/13/2024        History of Present Illness     History of Present Illness  The patient presents for evaluation of multiple medical concerns.    The patient has expressed a desire to resume her weight loss medication, having previously lost nearly 50 pounds. However, she has since experienced a gradual increase in her weight. Her current medication regimen includes Trulicity and phentermine. Her insurance has previously covered Trulicity, however, it has since been discontinued. She occasionally supplements her diet with vitamins. Despite her sedentary job, she maintains a healthy diet, incorporating snacks after dinner, and ensuring adequate hydration. Her physical activity is limited, but she plans to incorporate walking with her partner once the weather permits. She maintains regular dental check-ups and has recently started consistently wearing her glasses, particularly for reading, and plans to schedule an appointment with her ophthalmologist. She reports regular bowel movements.     Review of Systems   Constitutional:  Negative for fever and unexpected weight change.   HENT:  Negative for nosebleeds and trouble swallowing.    Eyes:  Negative for visual disturbance.   Respiratory:  Negative for chest tightness and shortness of breath.    Cardiovascular:  Negative for chest pain, palpitations and leg swelling.   Gastrointestinal:  Negative for abdominal pain, constipation, diarrhea and nausea.   Endocrine: Negative for cold intolerance.   Genitourinary:  Negative for dysuria and urgency.   Musculoskeletal:  Negative for joint swelling and myalgias.   Skin:  Negative for rash.   Neurological:  Negative for tremors, seizures and syncope.   Hematological:  Does not bruise/bleed easily.   Psychiatric/Behavioral:  Negative for hallucinations and suicidal ideas.      Objective     /78   Pulse 77   Ht 5' 6\" (1.676 m)   Wt 112 kg (248 lb)   SpO2 98%   BMI 40.03 " kg/m²     Physical Exam    Physical Exam  Vitals and nursing note reviewed.   Constitutional:       Appearance: She is well-developed. She is obese.   HENT:      Head: Normocephalic and atraumatic.      Right Ear: External ear normal.      Left Ear: External ear normal.      Nose: Nose normal.   Eyes:      Conjunctiva/sclera: Conjunctivae normal.      Pupils: Pupils are equal, round, and reactive to light.   Cardiovascular:      Rate and Rhythm: Normal rate and regular rhythm.      Heart sounds: Normal heart sounds. No murmur heard.  Pulmonary:      Effort: Pulmonary effort is normal.      Breath sounds: Normal breath sounds. No wheezing.   Abdominal:      General: Bowel sounds are normal.      Palpations: Abdomen is soft.   Musculoskeletal:         General: No tenderness. Normal range of motion.      Cervical back: Normal range of motion and neck supple.   Lymphadenopathy:      Cervical: No cervical adenopathy.   Skin:     General: Skin is warm and dry.      Capillary Refill: Capillary refill takes less than 2 seconds.   Neurological:      Mental Status: She is alert and oriented to person, place, and time.   Psychiatric:         Behavior: Behavior normal.         Thought Content: Thought content normal.         Judgment: Judgment normal.       Administrative Statements

## 2024-07-22 DIAGNOSIS — E66.01 CLASS 3 SEVERE OBESITY DUE TO EXCESS CALORIES WITH SERIOUS COMORBIDITY AND BODY MASS INDEX (BMI) OF 40.0 TO 44.9 IN ADULT (HCC): Primary | ICD-10-CM

## 2024-07-23 DIAGNOSIS — E03.9 ACQUIRED HYPOTHYROIDISM: ICD-10-CM

## 2024-07-23 RX ORDER — LEVOTHYROXINE SODIUM 0.07 MG/1
75 TABLET ORAL
Qty: 90 TABLET | Refills: 1 | Status: SHIPPED | OUTPATIENT
Start: 2024-07-23

## 2024-08-05 ENCOUNTER — TELEPHONE (OUTPATIENT)
Age: 54
End: 2024-08-05

## 2024-08-05 NOTE — TELEPHONE ENCOUNTER
PA for Semaglutide-Weight Management (WEGOVY) 0.25 MG/0.5ML Approved     Date(s) approved 7-6-2024 - 3-3-2025        Patient advised by          [x] Privarishart Message  [] Phone call   []LMOM  []L/M to call office as no active Communication consent on file  []Unable to leave detailed message as VM not approved on Communication consent       Pharmacy advised by    [x]Fax  []Phone call    Approval letter scanned into Media No letter not available at this time.

## 2024-08-05 NOTE — TELEPHONE ENCOUNTER
PA for Semaglutide-Weight Management (WEGOVY) 0.25 MG/0.5ML SUBMITTED     via    []CMM-KEY   [x][employer]-Case ID # [id number]  []Faxed to plan   []Other website   []Phone call Case ID #     Office notes sent, clinical questions answered. Awaiting determination    Turnaround time for your insurance to make a decision on your Prior Authorization can take 7-21 business days.

## 2024-09-05 DIAGNOSIS — E66.01 CLASS 3 SEVERE OBESITY DUE TO EXCESS CALORIES WITH SERIOUS COMORBIDITY AND BODY MASS INDEX (BMI) OF 40.0 TO 44.9 IN ADULT (HCC): Primary | ICD-10-CM

## 2024-09-05 RX ORDER — SEMAGLUTIDE 0.5 MG/.5ML
INJECTION, SOLUTION SUBCUTANEOUS
Qty: 2 ML | Refills: 0 | Status: SHIPPED | OUTPATIENT
Start: 2024-09-05

## 2024-12-03 DIAGNOSIS — E66.813 CLASS 3 SEVERE OBESITY DUE TO EXCESS CALORIES WITH SERIOUS COMORBIDITY AND BODY MASS INDEX (BMI) OF 40.0 TO 44.9 IN ADULT (HCC): Primary | ICD-10-CM

## 2024-12-03 DIAGNOSIS — E66.01 CLASS 3 SEVERE OBESITY DUE TO EXCESS CALORIES WITH SERIOUS COMORBIDITY AND BODY MASS INDEX (BMI) OF 40.0 TO 44.9 IN ADULT (HCC): Primary | ICD-10-CM

## 2024-12-03 RX ORDER — SEMAGLUTIDE 1 MG/.5ML
INJECTION, SOLUTION SUBCUTANEOUS
Qty: 2 ML | Refills: 0 | Status: SHIPPED | OUTPATIENT
Start: 2024-12-03 | End: 2024-12-06 | Stop reason: SDUPTHER

## 2024-12-06 DIAGNOSIS — E66.01 CLASS 3 SEVERE OBESITY DUE TO EXCESS CALORIES WITH SERIOUS COMORBIDITY AND BODY MASS INDEX (BMI) OF 40.0 TO 44.9 IN ADULT (HCC): ICD-10-CM

## 2024-12-06 DIAGNOSIS — E66.813 CLASS 3 SEVERE OBESITY DUE TO EXCESS CALORIES WITH SERIOUS COMORBIDITY AND BODY MASS INDEX (BMI) OF 40.0 TO 44.9 IN ADULT (HCC): ICD-10-CM

## 2024-12-06 RX ORDER — SEMAGLUTIDE 1 MG/.5ML
INJECTION, SOLUTION SUBCUTANEOUS
Qty: 2 ML | Refills: 0 | Status: SHIPPED | OUTPATIENT
Start: 2024-12-06

## 2024-12-06 NOTE — TELEPHONE ENCOUNTER
Message sent via [employer].   Could you please send it to [facility] in [place]? [facility] denied [facility], not taking new scripts for this.

## 2024-12-13 ENCOUNTER — TELEMEDICINE (OUTPATIENT)
Dept: FAMILY MEDICINE CLINIC | Facility: CLINIC | Age: 54
End: 2024-12-13
Payer: COMMERCIAL

## 2024-12-13 VITALS — HEIGHT: 66 IN | WEIGHT: 240 LBS | BODY MASS INDEX: 38.57 KG/M2

## 2024-12-13 DIAGNOSIS — R73.03 PREDIABETES: ICD-10-CM

## 2024-12-13 DIAGNOSIS — E55.9 VITAMIN D DEFICIENCY: Primary | ICD-10-CM

## 2024-12-13 DIAGNOSIS — E66.813 CLASS 3 SEVERE OBESITY DUE TO EXCESS CALORIES WITH SERIOUS COMORBIDITY AND BODY MASS INDEX (BMI) OF 40.0 TO 44.9 IN ADULT (HCC): ICD-10-CM

## 2024-12-13 DIAGNOSIS — E66.01 CLASS 3 SEVERE OBESITY DUE TO EXCESS CALORIES WITH SERIOUS COMORBIDITY AND BODY MASS INDEX (BMI) OF 40.0 TO 44.9 IN ADULT (HCC): ICD-10-CM

## 2024-12-13 DIAGNOSIS — Z79.899 OTHER LONG TERM (CURRENT) DRUG THERAPY: ICD-10-CM

## 2024-12-13 DIAGNOSIS — E53.8 B12 DEFICIENCY: ICD-10-CM

## 2024-12-13 DIAGNOSIS — E03.9 ACQUIRED HYPOTHYROIDISM: ICD-10-CM

## 2024-12-13 DIAGNOSIS — E78.2 MIXED HYPERLIPIDEMIA: ICD-10-CM

## 2024-12-13 PROCEDURE — 99214 OFFICE O/P EST MOD 30 MIN: CPT | Performed by: FAMILY MEDICINE

## 2024-12-13 NOTE — ASSESSMENT & PLAN NOTE
Orders:  •  TSH, 3rd generation with Free T4 reflex; Future  •  Comprehensive metabolic panel; Future

## 2024-12-13 NOTE — ASSESSMENT & PLAN NOTE
Orders:  •  TSH, 3rd generation with Free T4 reflex; Future  •  Vitamin D 25 hydroxy; Future  •  Comprehensive metabolic panel; Future

## 2024-12-13 NOTE — PROGRESS NOTES
Virtual Regular Visit  Name: Georgette Bedoya      : 1970      MRN: [id number]  Encounter Provider: Alfonso Medina MD  Encounter Date: 2024   Encounter department: [facility]      Verification of patient location:  Patient is located at Home in the following state in which I hold an active license [place] :  Assessment & Plan  Vitamin D deficiency    Orders:  •  TSH, 3rd generation with Free T4 reflex; Future  •  Vitamin D 25 hydroxy; Future  •  Comprehensive metabolic panel; Future    B12 deficiency    Orders:  •  TSH, 3rd generation with Free T4 reflex; Future  •  Vitamin B12; Future  •  Comprehensive metabolic panel; Future    Mixed hyperlipidemia    Orders:  •  TSH, 3rd generation with Free T4 reflex; Future  •  Lipid Panel with Direct LDL reflex; Future  •  Comprehensive metabolic panel; Future    Other long term (current) drug therapy    Orders:  •  TSH, 3rd generation with Free T4 reflex; Future  •  Hemoglobin A1C; Future  •  Comprehensive metabolic panel; Future    Class 3 severe obesity due to excess calories with serious comorbidity and body mass index (BMI) of 40.0 to 44.9 in adult (HCC)  Prior Authorization Clinical Questions for Weight Management Pharmacotherapy    2. Does the patient have a diagnosis of obesity, confirmed by a BMI greater than or equal to 30 kg/m^2?: Yes  3. Does the patient have a BMI of greater than or equal to 27 kg/m^2 with at least one weight-related comorbidity/risk factor/complication (e.g. diabetes, dyslipidemia, coronary artery disease)?: Yes  4. Weight-related co-morbidities/risk factors: prediabetes, dyslipidemia, asthma/reactive airway disease  5. Has the patient been on a weight loss regimen of low-calorie diet, increased physical activity, and lifestyle modifications for a minimum of 6 months?: Yes  6. Has the patient completed a comprehensive weight loss program (ie, Weight Watchers, Noom, Bariatrics, other khang on phone)? If so, what?: No  7.  Does the patient have a history of type 2 diabetes?: No  8. Has the member tried and failed other weight loss medication within the past 12 months?: No  9. Will the member use requested medication in combination with another GLP agonist or weight loss drug?: No  10. Is the medication a controlled substance?: No  For renewals: Has the patient had a positive outcome with current weight management medication (i.e., change in body weight of at least 4-5% after 12-16 weeks on maximally tolerated dose)?: Yes     Baseline weight (in pounds): 252 lbs  Current weight (in pounds): 240 lbs  Weight loss percentage: -4.76%         Orders:  •  TSH, 3rd generation with Free T4 reflex; Future  •  Comprehensive metabolic panel; Future    Acquired hypothyroidism    Orders:  •  TSH, 3rd generation with Free T4 reflex; Future  •  Comprehensive metabolic panel; Future    Prediabetes    Orders:  •  TSH, 3rd generation with Free T4 reflex; Future  •  Comprehensive metabolic panel; Future        Assessment & Plan  1. Weight management.  Her initial weight was recorded as 252 pounds, and she has achieved a weight loss of 4.7 percent, bringing her current weight down to 240 pounds. She is currently on a 1 mg dose of her weight loss medication, administered once weekly. The plan is to escalate the dosage to 1.7 mg, contingent on her tolerance, and maintain this regimen for a month. Subsequently, the dosage will be increased to 2.4 mg, which she will continue indefinitely. This approach is anticipated to facilitate a weight loss of approximately 10 to 15 percent of her body weight. She has been advised to report any instances of nausea, at which point Zofran will be prescribed. A repeat blood work will be scheduled in about 3 months to monitor her progress.    2. Vitamin B12 and vitamin D deficiency.  Her vitamin B12 and vitamin D levels were found to be deficient during her last visit. She has been advised to supplement her diet with 500 mcg  of vitamin B12 and 5000 IU of vitamin D3.    3. Health maintenance.  She has been reminded to complete her Cologuard test before June 2025. She has also been encouraged to schedule her mammogram before her next visit.    Follow-up  The patient is scheduled for a follow-up visit in 3 months.        Encounter provider Alfonso Medina MD    The patient was identified by name and date of birth. Georgette Bedoya was informed that this is a telemedicine visit and that the visit is being conducted through the [employer] Embedded platform. She agrees to proceed..  My office door was closed. No one else was in the room.  She acknowledged consent and understanding of privacy and security of the video platform. The patient has agreed to participate and understands they can discontinue the visit at any time.    Patient is aware this is a billable service.     History of Present Illness     HPI    History of Present Illness  The patient presents via virtual visit for follow-up on weight loss.    She reports a weight fluctuation of approximately 8 to 10 pounds since her last visit in June 2024, with a current weight of 240 pounds. She has discontinued the use of phentermine due to its adverse effects on her health. She experienced mild nausea at the onset of her treatment but has not had any recent episodes.    She has not been adhering to a regimen of B12 and vitamin D supplements, despite being aware of their necessity.    She has not been following up with her gynecologist for mammograms. She received a Cologuard kit in June 2024 but has not yet completed the test.    MEDICATIONS  - Discontinued: Phentermine      Review of Systems   Constitutional:  Negative for fever and unexpected weight change.   HENT:  Negative for nosebleeds and trouble swallowing.    Eyes:  Negative for visual disturbance.   Respiratory:  Negative for chest tightness and shortness of breath.    Cardiovascular:  Negative for chest pain, palpitations and leg swelling.  "  Gastrointestinal:  Negative for abdominal pain, constipation, diarrhea and nausea.   Endocrine: Negative for cold intolerance.   Genitourinary:  Negative for dysuria and urgency.   Musculoskeletal:  Negative for joint swelling and myalgias.   Skin:  Negative for rash.   Neurological:  Negative for tremors, seizures and syncope.   Hematological:  Does not bruise/bleed easily.   Psychiatric/Behavioral:  Negative for hallucinations and suicidal ideas.        Objective   Ht 5' 6\" (1.676 m)   Wt 109 kg (240 lb)   BMI 38.74 kg/m²     Physical Exam  Vitals reviewed.   Constitutional:       Appearance: She is well-developed. She is obese.   HENT:      Head: Normocephalic and atraumatic.   Pulmonary:      Effort: Pulmonary effort is normal.   Skin:     General: Skin is warm and dry.   Neurological:      Mental Status: She is alert and oriented to person, place, and time.   Psychiatric:         Behavior: Behavior normal.         Thought Content: Thought content normal.         Judgment: Judgment normal.         Visit Time  Total Visit Duration: 15  "

## 2024-12-31 DIAGNOSIS — E66.01 CLASS 3 SEVERE OBESITY DUE TO EXCESS CALORIES WITH SERIOUS COMORBIDITY AND BODY MASS INDEX (BMI) OF 40.0 TO 44.9 IN ADULT (HCC): Primary | ICD-10-CM

## 2024-12-31 DIAGNOSIS — E66.813 CLASS 3 SEVERE OBESITY DUE TO EXCESS CALORIES WITH SERIOUS COMORBIDITY AND BODY MASS INDEX (BMI) OF 40.0 TO 44.9 IN ADULT (HCC): Primary | ICD-10-CM

## 2024-12-31 RX ORDER — SEMAGLUTIDE 1.7 MG/.75ML
INJECTION, SOLUTION SUBCUTANEOUS
Qty: 3 ML | Refills: 0 | Status: SHIPPED | OUTPATIENT
Start: 2024-12-31

## 2025-01-29 DIAGNOSIS — E03.9 ACQUIRED HYPOTHYROIDISM: ICD-10-CM

## 2025-01-29 RX ORDER — LEVOTHYROXINE SODIUM 75 UG/1
TABLET ORAL
Qty: 30 TABLET | Refills: 0 | Status: SHIPPED | OUTPATIENT
Start: 2025-01-29

## 2025-02-04 DIAGNOSIS — E66.01 CLASS 3 SEVERE OBESITY DUE TO EXCESS CALORIES WITH SERIOUS COMORBIDITY AND BODY MASS INDEX (BMI) OF 40.0 TO 44.9 IN ADULT (HCC): Primary | ICD-10-CM

## 2025-02-04 DIAGNOSIS — E03.9 ACQUIRED HYPOTHYROIDISM: ICD-10-CM

## 2025-02-04 DIAGNOSIS — E78.2 MIXED HYPERLIPIDEMIA: ICD-10-CM

## 2025-02-04 DIAGNOSIS — E66.813 CLASS 3 SEVERE OBESITY DUE TO EXCESS CALORIES WITH SERIOUS COMORBIDITY AND BODY MASS INDEX (BMI) OF 40.0 TO 44.9 IN ADULT (HCC): Primary | ICD-10-CM

## 2025-02-04 DIAGNOSIS — R73.03 PREDIABETES: ICD-10-CM

## 2025-02-04 RX ORDER — SEMAGLUTIDE 2.4 MG/.75ML
INJECTION, SOLUTION SUBCUTANEOUS
Qty: 9 ML | Refills: 1 | Status: SHIPPED | OUTPATIENT
Start: 2025-02-04

## 2025-03-10 ENCOUNTER — TELEPHONE (OUTPATIENT)
Dept: FAMILY MEDICINE CLINIC | Facility: CLINIC | Age: 55
End: 2025-03-10

## 2025-03-10 NOTE — TELEPHONE ENCOUNTER
"PA has been started for the patient by the pharmacy for Wegovy 2.4mg/0.75 ml Auto-injectors.      PA  on 3/3/25.      Login to [email or web address]/login and click \"enter a key\".    Enter the patient's last name, date of birth and the key.    Key: [id number]    Complete the PA and click \"send to plan\" for approval.        PA request scanned in.    "

## 2025-03-11 NOTE — TELEPHONE ENCOUNTER
PA for Wegovy 2.4 mg/0.75 ml SUBMITTED to [facility]     via    []CMM-KEY:   [x][employer]-Case ID # [id number]   [][employer]-Auth ID # NDC #   []Faxed to plan   []Other website   []Phone call Case ID #     []PA sent as URGENT    All office notes, labs and other pertaining documents and studies sent. Clinical questions answered. Awaiting determination from insurance company.     Turnaround time for your insurance to make a decision on your Prior Authorization can take 7-21 business days.

## 2025-03-11 NOTE — TELEPHONE ENCOUNTER
PA for wegovy 2.4 mg/0.75 ml  APPROVED     Date(s) approved  February 9, 2025 to October 7, 2025     Case #[id number]     Patient advised by          [x]MyChart Message  []Phone call   []LMOM  []L/M to call office as no active Communication consent on file  [x]Unable to leave detailed message as VM not approved on Communication consent       Pharmacy advised by    [x]Fax  []Phone call  []Secure Chat    Specialty Pharmacy    []     Approval letter scanned into [employer]

## 2025-03-19 ENCOUNTER — TELEPHONE (OUTPATIENT)
Age: 55
End: 2025-03-19

## 2025-03-19 NOTE — TELEPHONE ENCOUNTER
Pt requested her  lab orders to be faxed to [phone]. Please fax before 8am tomorrow as she has an appt. Thank you .

## 2025-03-24 ENCOUNTER — RESULTS FOLLOW-UP (OUTPATIENT)
Dept: FAMILY MEDICINE CLINIC | Facility: CLINIC | Age: 55
End: 2025-03-24

## 2025-03-24 ENCOUNTER — OFFICE VISIT (OUTPATIENT)
Dept: FAMILY MEDICINE CLINIC | Facility: CLINIC | Age: 55
End: 2025-03-24
Payer: COMMERCIAL

## 2025-03-24 VITALS
BODY MASS INDEX: 36.64 KG/M2 | HEIGHT: 66 IN | SYSTOLIC BLOOD PRESSURE: 126 MMHG | HEART RATE: 82 BPM | OXYGEN SATURATION: 98 % | WEIGHT: 228 LBS | DIASTOLIC BLOOD PRESSURE: 82 MMHG

## 2025-03-24 DIAGNOSIS — E66.812 CLASS 2 SEVERE OBESITY DUE TO EXCESS CALORIES WITH SERIOUS COMORBIDITY AND BODY MASS INDEX (BMI) OF 39.0 TO 39.9 IN ADULT (HCC): ICD-10-CM

## 2025-03-24 DIAGNOSIS — E03.9 ACQUIRED HYPOTHYROIDISM: Primary | ICD-10-CM

## 2025-03-24 DIAGNOSIS — E66.01 CLASS 2 SEVERE OBESITY DUE TO EXCESS CALORIES WITH SERIOUS COMORBIDITY AND BODY MASS INDEX (BMI) OF 39.0 TO 39.9 IN ADULT (HCC): ICD-10-CM

## 2025-03-24 PROBLEM — R73.03 PREDIABETES: Status: RESOLVED | Noted: 2021-01-11 | Resolved: 2025-03-24

## 2025-03-24 LAB — COLOGUARD RESULT REPORTABLE: NEGATIVE

## 2025-03-24 PROCEDURE — 99214 OFFICE O/P EST MOD 30 MIN: CPT | Performed by: FAMILY MEDICINE

## 2025-03-24 RX ORDER — LEVOTHYROXINE SODIUM 75 UG/1
75 TABLET ORAL
Qty: 90 TABLET | Refills: 1 | Status: SHIPPED | OUTPATIENT
Start: 2025-03-24

## 2025-03-24 NOTE — ASSESSMENT & PLAN NOTE
Orders:    levothyroxine 75 mcg tablet; Take 1 tablet (75 mcg total) by mouth daily in the early morning

## 2025-03-24 NOTE — PROGRESS NOTES
Name: Georgette Bedoya      : 1970      MRN: [id number]  Encounter Provider: Alfonso Medina MD  Encounter Date: 3/24/2025   Encounter department: [facility]    Assessment & Plan  Acquired hypothyroidism    Orders:    levothyroxine 75 mcg tablet; Take 1 tablet (75 mcg total) by mouth daily in the early morning    Class 2 severe obesity due to excess calories with serious comorbidity and body mass index (BMI) of 39.0 to 39.9 in adult (HCC)  Prior Authorization Clinical Questions for Weight Management Pharmacotherapy    1. Does the patient have a contrainidcation to medication prescribed for weight management?: No  2. Does the patient have a diagnosis of obesity, confirmed by a BMI greater than or equal to 30 kg/m^2?: Yes  3. Does the patient have a BMI of greater than or equal to 27 kg/m^2 with at least one weight-related comorbidity/risk factor/complication (e.g. diabetes, dyslipidemia, coronary artery disease)?: No  4. Weight-related co-morbidities/risk factors: prediabetes, metabolic syndrome, dyslipidemia  7. Has the patient been on a weight loss regimen of low-calorie diet, increased physical activity, and lifestyle modifications for a minimum of 6 months?: Yes  8. Has the patient completed a comprehensive weight loss program (ie, Weight Watchers, Noom, Bariatrics, other khang on phone)? If so, what?: No  9. Does the patient have a history of type 2 diabetes?: No  10. Has the member tried and failed other weight loss medication within the past 12 months?: No  11. Will the member use requested medication in combination with another GLP agonist or weight loss drug?: No  12. Is the medication a controlled substance?: No  For renewals: Has the patient had a positive outcome with current weight management medication (i.e., change in body weight of at least 4-5% after 12-16 weeks on maximally tolerated dose)?: Yes     Baseline weight (in pounds): 252 lbs  Current weight (in pounds): 228  lbs  Weight loss percentage: -9.52%                Assessment & Plan  1. Weight management.  Her weight has decreased from 248 to 228 pounds. She is currently on Wegovy and reports manageable side effects such as gurgling and gas. She is advised to continue her current regimen. If she notices a plateau in her weight loss, she should increase her exercise and focus on building muscle mass. She is also advised to consider using a smart scale to monitor body fat percentage, water weight, protein level, visceral fat, and basic metabolic rate. If she experiences fatigue, she should consider increasing her protein intake through shakes or bars.    2. Elevated cholesterol.  Her cholesterol levels are not optimal, with LDL at 146 and triglycerides at 158. She is advised to take an over-the-counter supplement called red yeast rice with CoQ10 approximately 200 mg to help lower her cholesterol levels.    3. Thyroid management.  Her thyroid function is stable with a TSH level of 1.6. A prescription for levothyroxine 75 mcg will be sent to [facility].    Follow-up  The patient will follow up in 4 months.       History of Present Illness     History of Present Illness  The patient presents for a follow-up on Wegovy.    She reports a positive response to the medication, with a significant weight loss of 20 pounds, from 248 to 228. She experiences minor side effects such as gurgling and gas, which she finds manageable. These symptoms serve as a reminder for her to moderate her food intake. She has not yet procured a smart scale for home use.    MEDICATIONS  - Current: Wegovy  - Current: Levothyroxine     Review of Systems   Constitutional:  Negative for fever and unexpected weight change.   HENT:  Negative for nosebleeds and trouble swallowing.    Eyes:  Negative for visual disturbance.   Respiratory:  Negative for chest tightness and shortness of breath.    Cardiovascular:  Negative for chest pain, palpitations and leg  "swelling.   Gastrointestinal:  Negative for abdominal pain, constipation, diarrhea and nausea.   Endocrine: Negative for cold intolerance.   Genitourinary:  Negative for dysuria and urgency.   Musculoskeletal:  Negative for joint swelling and myalgias.   Skin:  Negative for rash.   Neurological:  Negative for tremors, seizures and syncope.   Hematological:  Does not bruise/bleed easily.   Psychiatric/Behavioral:  Negative for hallucinations and suicidal ideas.      Objective   /82   Pulse 82   Ht 5' 6\" (1.676 m)   Wt 103 kg (228 lb)   SpO2 98%   BMI 36.80 kg/m²     Physical Exam  - Vital Signs:    - Weight: 228 lbs  Physical Exam  Vitals and nursing note reviewed.   Constitutional:       Appearance: She is well-developed. She is obese.   HENT:      Head: Normocephalic and atraumatic.      Right Ear: External ear normal.      Left Ear: External ear normal.      Nose: Nose normal.   Eyes:      Conjunctiva/sclera: Conjunctivae normal.      Pupils: Pupils are equal, round, and reactive to light.   Cardiovascular:      Rate and Rhythm: Normal rate and regular rhythm.      Heart sounds: Normal heart sounds. No murmur heard.  Pulmonary:      Effort: Pulmonary effort is normal.      Breath sounds: Normal breath sounds. No wheezing.   Abdominal:      General: Bowel sounds are normal.      Palpations: Abdomen is soft.   Musculoskeletal:         General: No tenderness. Normal range of motion.      Cervical back: Normal range of motion and neck supple.   Lymphadenopathy:      Cervical: No cervical adenopathy.   Skin:     General: Skin is warm and dry.      Capillary Refill: Capillary refill takes less than 2 seconds.   Neurological:      Mental Status: She is alert and oriented to person, place, and time.   Psychiatric:         Behavior: Behavior normal.         Thought Content: Thought content normal.         Judgment: Judgment normal.         "

## 2025-03-25 ENCOUNTER — RESULTS FOLLOW-UP (OUTPATIENT)
Dept: FAMILY MEDICINE CLINIC | Facility: CLINIC | Age: 55
End: 2025-03-25